# Patient Record
Sex: MALE | Race: WHITE | HISPANIC OR LATINO | Employment: OTHER | URBAN - NONMETROPOLITAN AREA
[De-identification: names, ages, dates, MRNs, and addresses within clinical notes are randomized per-mention and may not be internally consistent; named-entity substitution may affect disease eponyms.]

---

## 2024-01-08 DIAGNOSIS — I82.90 VENOUS THROMBOSIS: ICD-10-CM

## 2024-01-08 PROBLEM — I63.9 STROKE (MULTI): Status: ACTIVE | Noted: 2024-01-08

## 2024-01-08 PROBLEM — R52 PAIN: Status: ACTIVE | Noted: 2024-01-08

## 2024-01-08 PROBLEM — R47.1 DYSARTHRIA: Status: ACTIVE | Noted: 2024-01-08

## 2024-01-08 PROBLEM — M54.9 BACKACHE: Status: ACTIVE | Noted: 2024-01-08

## 2024-01-08 PROBLEM — D50.9 IRON DEFICIENCY ANEMIA: Status: ACTIVE | Noted: 2024-01-08

## 2024-01-08 PROBLEM — I10 HIGH BLOOD PRESSURE: Status: ACTIVE | Noted: 2024-01-08

## 2024-01-08 PROBLEM — E11.51: Status: ACTIVE | Noted: 2024-01-08

## 2024-01-08 PROBLEM — R26.9 GAIT ABNORMALITY: Status: ACTIVE | Noted: 2024-01-08

## 2024-01-08 PROBLEM — D64.9 NORMOCHROMIC NORMOCYTIC ANEMIA: Status: ACTIVE | Noted: 2024-01-08

## 2024-01-08 PROBLEM — Z86.73 HISTORY OF STROKE WITHOUT RESIDUAL DEFICITS: Status: ACTIVE | Noted: 2024-01-08

## 2024-01-08 PROBLEM — E11.3393: Status: ACTIVE | Noted: 2024-01-08

## 2024-01-08 PROBLEM — E87.8 FLUID VOLUME DISORDER: Status: ACTIVE | Noted: 2024-01-08

## 2024-01-08 PROBLEM — R29.898 RUE WEAKNESS: Status: ACTIVE | Noted: 2024-01-08

## 2024-01-08 PROBLEM — I10 BENIGN ESSENTIAL HYPERTENSION: Status: ACTIVE | Noted: 2024-01-08

## 2024-01-08 PROBLEM — Z91.148 NONCOMPLIANCE WITH MEDICATION REGIMEN: Status: ACTIVE | Noted: 2024-01-08

## 2024-01-08 PROBLEM — D72.819 LEUKOPENIA: Status: ACTIVE | Noted: 2024-01-08

## 2024-01-08 PROBLEM — E78.00 HIGH CHOLESTEROL: Status: ACTIVE | Noted: 2024-01-08

## 2024-01-08 PROBLEM — E11.65 HYPERGLYCEMIA DUE TO TYPE 2 DIABETES MELLITUS (MULTI): Status: ACTIVE | Noted: 2024-01-08

## 2024-01-08 PROBLEM — M54.50 ACUTE LOW BACK PAIN: Status: ACTIVE | Noted: 2024-01-08

## 2024-01-08 PROBLEM — R26.89 OTHER ABNORMALITIES OF GAIT AND MOBILITY: Status: ACTIVE | Noted: 2024-01-08

## 2024-01-08 RX ORDER — HYDROCHLOROTHIAZIDE 25 MG/1
TABLET ORAL
COMMUNITY
End: 2024-03-23

## 2024-01-08 RX ORDER — ATORVASTATIN CALCIUM 40 MG/1
1 TABLET, FILM COATED ORAL DAILY
COMMUNITY
End: 2024-03-11

## 2024-01-08 RX ORDER — BROMFENAC SODIUM 0.7 MG/ML
SOLUTION/ DROPS OPHTHALMIC
COMMUNITY
Start: 2023-12-13

## 2024-01-08 RX ORDER — METFORMIN HYDROCHLORIDE 500 MG/1
TABLET ORAL
COMMUNITY
End: 2024-01-09 | Stop reason: ALTCHOICE

## 2024-01-08 RX ORDER — ASPIRIN 81 MG/1
TABLET ORAL
COMMUNITY

## 2024-01-08 RX ORDER — FERROUS SULFATE 325(65) MG
TABLET ORAL
COMMUNITY

## 2024-01-08 RX ORDER — LISINOPRIL 20 MG/1
TABLET ORAL
COMMUNITY
End: 2024-01-09 | Stop reason: ALTCHOICE

## 2024-01-08 RX ORDER — PIOGLITAZONEHYDROCHLORIDE 15 MG/1
TABLET ORAL
COMMUNITY
End: 2024-03-23

## 2024-01-08 RX ORDER — CLOPIDOGREL BISULFATE 75 MG/1
TABLET ORAL
OUTPATIENT
Start: 2024-01-08

## 2024-01-08 RX ORDER — NAPROXEN SODIUM 220 MG/1
TABLET, FILM COATED ORAL
COMMUNITY
End: 2024-01-09 | Stop reason: ALTCHOICE

## 2024-01-08 RX ORDER — LISINOPRIL AND HYDROCHLOROTHIAZIDE 20; 25 MG/1; MG/1
TABLET ORAL
COMMUNITY
End: 2024-01-09 | Stop reason: ALTCHOICE

## 2024-01-08 RX ORDER — BACLOFEN 10 MG/1
TABLET ORAL
COMMUNITY
End: 2024-01-09 | Stop reason: ALTCHOICE

## 2024-01-08 RX ORDER — CLOPIDOGREL BISULFATE 75 MG/1
TABLET ORAL
COMMUNITY
End: 2024-01-23 | Stop reason: SDUPTHER

## 2024-01-08 RX ORDER — INSULIN DEGLUDEC 100 U/ML
INJECTION, SOLUTION SUBCUTANEOUS
COMMUNITY

## 2024-01-08 RX ORDER — MULTIVITAMIN
TABLET ORAL
COMMUNITY

## 2024-01-09 ENCOUNTER — OFFICE VISIT (OUTPATIENT)
Dept: PRIMARY CARE | Facility: CLINIC | Age: 79
End: 2024-01-09
Payer: MEDICARE

## 2024-01-09 VITALS
WEIGHT: 174.6 LBS | DIASTOLIC BLOOD PRESSURE: 62 MMHG | OXYGEN SATURATION: 97 % | HEART RATE: 82 BPM | BODY MASS INDEX: 25.41 KG/M2 | SYSTOLIC BLOOD PRESSURE: 132 MMHG

## 2024-01-09 DIAGNOSIS — Z79.4 TYPE 2 DIABETES MELLITUS WITHOUT COMPLICATION, WITH LONG-TERM CURRENT USE OF INSULIN (MULTI): ICD-10-CM

## 2024-01-09 DIAGNOSIS — H26.9 CATARACT OF BOTH EYES, UNSPECIFIED CATARACT TYPE: Primary | ICD-10-CM

## 2024-01-09 DIAGNOSIS — E11.9 TYPE 2 DIABETES MELLITUS WITHOUT COMPLICATION, WITH LONG-TERM CURRENT USE OF INSULIN (MULTI): ICD-10-CM

## 2024-01-09 LAB — POC HEMOGLOBIN A1C: 9.1 % (ref 4.2–6.5)

## 2024-01-09 PROCEDURE — 93010 ELECTROCARDIOGRAM REPORT: CPT | Performed by: FAMILY MEDICINE

## 2024-01-09 PROCEDURE — 3075F SYST BP GE 130 - 139MM HG: CPT | Performed by: FAMILY MEDICINE

## 2024-01-09 PROCEDURE — 99214 OFFICE O/P EST MOD 30 MIN: CPT | Performed by: FAMILY MEDICINE

## 2024-01-09 PROCEDURE — 1036F TOBACCO NON-USER: CPT | Performed by: FAMILY MEDICINE

## 2024-01-09 PROCEDURE — 1126F AMNT PAIN NOTED NONE PRSNT: CPT | Performed by: FAMILY MEDICINE

## 2024-01-09 PROCEDURE — 3078F DIAST BP <80 MM HG: CPT | Performed by: FAMILY MEDICINE

## 2024-01-09 PROCEDURE — 1159F MED LIST DOCD IN RCRD: CPT | Performed by: FAMILY MEDICINE

## 2024-01-09 PROCEDURE — 93005 ELECTROCARDIOGRAM TRACING: CPT | Performed by: FAMILY MEDICINE

## 2024-01-09 ASSESSMENT — ENCOUNTER SYMPTOMS
PALPITATIONS: 0
OCCASIONAL FEELINGS OF UNSTEADINESS: 0
POLYPHAGIA: 0
POLYDIPSIA: 0
ABDOMINAL DISTENTION: 0
DEPRESSION: 0
DIZZINESS: 0
COUGH: 0
CHEST TIGHTNESS: 0
HEADACHES: 0
LIGHT-HEADEDNESS: 0
LOSS OF SENSATION IN FEET: 0
SHORTNESS OF BREATH: 0
ACTIVITY CHANGE: 0

## 2024-01-09 ASSESSMENT — PATIENT HEALTH QUESTIONNAIRE - PHQ9
2. FEELING DOWN, DEPRESSED OR HOPELESS: NOT AT ALL
1. LITTLE INTEREST OR PLEASURE IN DOING THINGS: NOT AT ALL
SUM OF ALL RESPONSES TO PHQ9 QUESTIONS 1 AND 2: 0

## 2024-01-09 ASSESSMENT — PAIN SCALES - GENERAL: PAINLEVEL: 0-NO PAIN

## 2024-01-09 NOTE — PROGRESS NOTES
Subjective   Patient ID: Landry Hernández is a 78 y.o. male who presents for Surgical Clearance.    HPI   He presents today for surgical clearance for the cataract surgery.  He states he has bilateral cataracts he is going to have the surgeries on different days.    He does still see the nurse practitioner at Dr. Guardado's office.  I do not see that he has been there recently.  Hemoglobin A1c is elevated.  He states he checks his blood sugar once every few weeks.    Review of Systems   Constitutional:  Negative for activity change.   Respiratory:  Negative for cough, chest tightness and shortness of breath.    Cardiovascular:  Negative for chest pain, palpitations and leg swelling.   Gastrointestinal:  Negative for abdominal distention.   Endocrine: Negative for polydipsia, polyphagia and polyuria.   Neurological:  Negative for dizziness, light-headedness and headaches.       Objective   /62   Pulse 82   Wt 79.2 kg (174 lb 9.6 oz)   SpO2 97%   BMI 25.41 kg/m²     Physical Exam  Constitutional:       Appearance: Normal appearance.   HENT:      Mouth/Throat:      Mouth: Mucous membranes are moist.      Pharynx: Oropharynx is clear.   Neck:      Thyroid: No thyromegaly or thyroid tenderness.      Vascular: No carotid bruit.   Cardiovascular:      Rate and Rhythm: Normal rate and regular rhythm.      Heart sounds: No murmur heard.  Pulmonary:      Effort: Pulmonary effort is normal.      Breath sounds: Normal breath sounds.   Musculoskeletal:      Cervical back: Neck supple.   Lymphadenopathy:      Cervical: No cervical adenopathy.   Neurological:      Mental Status: He is alert.   Psychiatric:         Mood and Affect: Mood normal.         Assessment/Plan   Diagnoses and all orders for this visit:  Cataract of both eyes, unspecified cataract type  Type 2 diabetes mellitus without complication, with long-term current use of insulin (CMS/[facility])  He is cleared for the cataract surgery but is sugar remains high.  I  discussed with him wearing a freestyle kamryn and checking with the sensor so that we can get an idea what his blood sugars are.  He is agreeable to this.  I will have Pham call him and set this up and instruct him in this and we can download this and adjust his medicines for him.  We can do that here or we can have Dr. Guardado's office do it, since Pham works in both spots.

## 2024-01-23 DIAGNOSIS — I63.9 CEREBROVASCULAR ACCIDENT (CVA), UNSPECIFIED MECHANISM (MULTI): Primary | ICD-10-CM

## 2024-01-23 RX ORDER — CLOPIDOGREL BISULFATE 75 MG/1
75 TABLET ORAL DAILY
Qty: 90 TABLET | Refills: 0 | Status: SHIPPED | OUTPATIENT
Start: 2024-01-23 | End: 2024-03-19

## 2024-01-23 NOTE — TELEPHONE ENCOUNTER
Last OV 01/09/2024-I called [facility] and was told last fill was 06/2023 from Dr Wiggins. The patients wife could not remember who the RX was first written by since she stated the patient does not see anyone else except for Luanne Brandt. The patient has had strokes in the past.

## 2024-03-08 DIAGNOSIS — E78.00 HIGH CHOLESTEROL: Primary | ICD-10-CM

## 2024-03-11 RX ORDER — ATORVASTATIN CALCIUM 40 MG/1
40 TABLET, FILM COATED ORAL DAILY
Qty: 90 TABLET | Refills: 0 | Status: SHIPPED | OUTPATIENT
Start: 2024-03-11 | End: 2024-05-08

## 2024-03-18 DIAGNOSIS — I63.9 CEREBROVASCULAR ACCIDENT (CVA), UNSPECIFIED MECHANISM (MULTI): ICD-10-CM

## 2024-03-19 RX ORDER — CLOPIDOGREL BISULFATE 75 MG/1
75 TABLET ORAL DAILY
Qty: 90 TABLET | Refills: 3 | Status: SHIPPED | OUTPATIENT
Start: 2024-03-19

## 2024-03-19 NOTE — TELEPHONE ENCOUNTER
Last refill 1/23   90 tabs   0 refill  Patient confirmed he has approx 60 tabs left, no refill needed.  Please cancel rx

## 2024-03-22 DIAGNOSIS — E11.51: ICD-10-CM

## 2024-03-22 DIAGNOSIS — I10 PRIMARY HYPERTENSION: Primary | ICD-10-CM

## 2024-03-23 RX ORDER — HYDROCHLOROTHIAZIDE 25 MG/1
TABLET ORAL
Qty: 90 TABLET | Refills: 0 | Status: SHIPPED | OUTPATIENT
Start: 2024-03-23

## 2024-03-23 RX ORDER — PIOGLITAZONEHYDROCHLORIDE 15 MG/1
TABLET ORAL
Qty: 90 TABLET | Refills: 0 | Status: SHIPPED | OUTPATIENT
Start: 2024-03-23

## 2024-04-25 DIAGNOSIS — E11.65 TYPE 2 DIABETES MELLITUS WITH HYPERGLYCEMIA, UNSPECIFIED WHETHER LONG TERM INSULIN USE (MULTI): Primary | ICD-10-CM

## 2024-04-26 RX ORDER — METFORMIN HYDROCHLORIDE 1000 MG/1
1000 TABLET ORAL 2 TIMES DAILY
Qty: 200 TABLET | Refills: 2 | Status: SHIPPED | OUTPATIENT
Start: 2024-04-26

## 2024-04-30 ENCOUNTER — TELEPHONE (OUTPATIENT)
Dept: ENDOCRINOLOGY | Facility: CLINIC | Age: 79
End: 2024-04-30
Payer: MEDICARE

## 2024-04-30 NOTE — TELEPHONE ENCOUNTER
Called to advise patient that his patient assistance for Ozempic and Forteo came into the office.     Daniella

## 2024-05-01 ENCOUNTER — TELEPHONE (OUTPATIENT)
Dept: ENDOCRINOLOGY | Facility: CLINIC | Age: 79
End: 2024-05-01
Payer: MEDICARE

## 2024-05-02 ENCOUNTER — TELEPHONE (OUTPATIENT)
Dept: ENDOCRINOLOGY | Facility: CLINIC | Age: 79
End: 2024-05-02
Payer: MEDICARE

## 2024-05-07 ENCOUNTER — TELEPHONE (OUTPATIENT)
Dept: ENDOCRINOLOGY | Facility: CLINIC | Age: 79
End: 2024-05-07
Payer: MEDICARE

## 2024-05-07 DIAGNOSIS — E78.00 HIGH CHOLESTEROL: ICD-10-CM

## 2024-05-07 NOTE — TELEPHONE ENCOUNTER
Patient's spouse called in stating Metformin was stopped by [facility], but [facility] sent in a new Rx for the medication.     I advised patient to call the mail order pharmacy and cancel the mailing of the medication.     Daniella

## 2024-05-08 RX ORDER — ATORVASTATIN CALCIUM 40 MG/1
40 TABLET, FILM COATED ORAL DAILY
Qty: 90 TABLET | Refills: 3 | Status: SHIPPED | OUTPATIENT
Start: 2024-05-08

## 2024-06-11 ENCOUNTER — TELEPHONE (OUTPATIENT)
Dept: PRIMARY CARE | Facility: CLINIC | Age: 79
End: 2024-06-11
Payer: MEDICARE

## 2024-06-11 NOTE — TELEPHONE ENCOUNTER
Received paperwork for DNR paperwork.  Call placed to spouse Rachell and explained the difference between DNRCC ARREST and DNRCC.  Pt and spouse both stated that he wants DNRCC.

## 2024-06-11 NOTE — TELEPHONE ENCOUNTER
I signed it. 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

## 2024-06-24 DIAGNOSIS — E11.51: ICD-10-CM

## 2024-06-24 DIAGNOSIS — I10 PRIMARY HYPERTENSION: ICD-10-CM

## 2024-06-25 RX ORDER — PIOGLITAZONEHYDROCHLORIDE 15 MG/1
15 TABLET ORAL DAILY
Qty: 90 TABLET | Refills: 0 | Status: SHIPPED | OUTPATIENT
Start: 2024-06-25 | End: 2024-09-23

## 2024-06-25 RX ORDER — HYDROCHLOROTHIAZIDE 25 MG/1
25 TABLET ORAL
Qty: 90 TABLET | Refills: 0 | Status: SHIPPED | OUTPATIENT
Start: 2024-06-25

## 2024-06-27 ENCOUNTER — APPOINTMENT (OUTPATIENT)
Dept: ENDOCRINOLOGY | Facility: CLINIC | Age: 79
End: 2024-06-27

## 2024-06-27 VITALS
BODY MASS INDEX: 25.36 KG/M2 | WEIGHT: 174.2 LBS | HEART RATE: 54 BPM | DIASTOLIC BLOOD PRESSURE: 50 MMHG | SYSTOLIC BLOOD PRESSURE: 128 MMHG

## 2024-06-27 DIAGNOSIS — Z79.4 TYPE 2 DIABETES MELLITUS WITH HYPERGLYCEMIA, WITH LONG-TERM CURRENT USE OF INSULIN (MULTI): Primary | ICD-10-CM

## 2024-06-27 DIAGNOSIS — E11.65 TYPE 2 DIABETES MELLITUS WITH HYPERGLYCEMIA, WITH LONG-TERM CURRENT USE OF INSULIN (MULTI): Primary | ICD-10-CM

## 2024-06-27 LAB — POC HEMOGLOBIN A1C: 8.9 % (ref 4.2–6.5)

## 2024-06-27 PROCEDURE — 99214 OFFICE O/P EST MOD 30 MIN: CPT | Performed by: INTERNAL MEDICINE

## 2024-06-27 PROCEDURE — 83036 HEMOGLOBIN GLYCOSYLATED A1C: CPT | Performed by: INTERNAL MEDICINE

## 2024-06-27 NOTE — PROGRESS NOTES
HPI     Here for follow up care/metabolic management 78 yo with DM Type 2 diagnosed over 10 years ago. History HTN, HLD, CVA.  A1c- 8.9% today.  Last seen by our office June 2023.    Patient testing sugars 0 time a day.  Unclear if he is following carb controlled diet, cultural background food high in carbohydrates. Patient able to afford their medications. Patient is exercising, walking.    -CGM discusssed previous visits, not interested    Taking Tresiba 30 units, metformin max dose, victoza 1.8mg daily, pioglitazone 15mg.   -patient unable to state dose of insulin/victoza taking, wife gives injections daily    -HTN HCTZ daily, Lisinopril previously (was having K+ elevations)    -STATIN Atorvastatin 40mg daily & tolerating        Current Outpatient Medications:     aspirin (Kolby Low Dose Aspirin) 81 mg EC tablet, , Disp: , Rfl:     atorvastatin (Lipitor) 40 mg tablet, TAKE 1 TABLET BY MOUTH ONCE  DAILY, Disp: 90 tablet, Rfl: 3    clopidogrel (Plavix) 75 mg tablet, TAKE 1 TABLET BY MOUTH ONCE  DAILY, Disp: 90 tablet, Rfl: 3    ferrous sulfate, 325 mg ferrous sulfate, tablet, TAKE ONE TABLET BY MOUTH THREE TIMES A DAY Oral, Disp: , Rfl:     hydroCHLOROthiazide (HYDRODiuril) 25 mg tablet, TAKE 1 TABLET BY MOUTH EVERY MORNING, Disp: 90 tablet, Rfl: 0    insulin degludec (Tresiba FlexTouch U-100) 100 unit/mL (3 mL) injection, USE AS DIRECTED UP TO 30 UNITS EVERY DAY Subcutaneous, Disp: , Rfl:     metFORMIN (Glucophage) 1,000 mg tablet, TAKE 1 TABLET BY MOUTH TWICE  DAILY, Disp: 200 tablet, Rfl: 2    multivitamin (Daily Multi-Vitamin) tablet, Take by mouth., Disp: , Rfl:     pioglitazone (Actos) 15 mg tablet, TAKE 1 TABLET BY MOUTH EVERY DAY FOR 90 DAYS, Disp: 90 tablet, Rfl: 0    Prolensa 0.07 % drops, Instill 1 drop to affected eye every day.  Start 3 days before surgery., Disp: , Rfl:     Allergies as of 06/27/2024    (No Known Allergies)       /50   Pulse 54   Wt 79 kg (174 lb 3.2 oz)   BMI 25.36 kg/m²      Labs:   Lab Results   Component Value Date    WBC 6.0 06/08/2023    NRBC 0 06/08/2023    RBC 4.31 (L) 06/08/2023    HGB 13.2 (L) 06/08/2023    HCT 41.8 06/08/2023     06/08/2023     Lab Results   Component Value Date    CALCIUM 9.7 06/08/2023    AST 25 06/08/2023    ALKPHOS 86 06/08/2023    BILITOT 0.4 06/08/2023    PROT 6.5 06/08/2023    ALBUMIN 4.0 06/08/2023    GLOB 2.5 06/08/2023    AGR 1.6 06/08/2023     06/08/2023    K 5.0 06/08/2023     06/08/2023    CO2 28 06/08/2023    ANIONGAP 9 06/08/2023    BUN 17 06/08/2023    CREATININE 1.3 06/08/2023    UREACREAUR 13.1 06/08/2023    GLUCOSE 106 (H) 06/08/2023    ALT 20 06/08/2023    EGFR 56 06/08/2023     Lab Results   Component Value Date    CHOL 122 (L) 06/08/2023    TRIG 68 06/08/2023    HDL 45 06/08/2023    LDLCALC 63 (L) 06/08/2023     Lab Results   Component Value Date    MICROALBCREA 87.3 (H) 06/08/2023     Lab Results   Component Value Date    TSH 3.22 06/02/2022     Lab Results   Component Value Date    [id number] 163 (L) 06/08/2023     Lab Results   Component Value Date    HGBA1C 8.9 (A) 06/27/2024         Assessment/Plan   1. Type 2 diabetes mellitus with hyperglycemia, with long-term current use of insulin (Multi)    -A1c ordered & reviewed  -labs/notes reviewed     -pt does not know any of his medications he is taking, is not testing blood sugar   -have repeatedly asked pt's wife to accompany him to visit   -our office can not continue to provide care/medication refills if we do not know what pt is doing       -Mrs. Hernández, please make a follow up appointment with Max, our nurse practitioner in 2-3 months.    **We request that you accompany Mr. Hernández to this visit before our office will provide any further medication refills**      Follow up: 2-3mon Max    -labs/tests/notes reviewed  -reviewed and counseled patient on medication monitoring and side effects    Treatment and plan discussed with Dr. Rousseau.  JOSEPHINE Brewer, PharmD,  BC-ADM, [facility].     Medical Decision Making  Complexity of problem: Chronic illness of diabetes mellitus uncontrolled, progressing  Data analyzed and reviewed: Reviewed prior notes, blood glucose data, labs including HgbA1c, lipids, serum chemistries.  Ordered tests.   Risk of complications and morbidities: Is definite because of use of insulin and risk of hypoglycemia.  Prescription medications reviewed and modifications made.  Compliance assessed.  Addressed social determinants of health including food insecurity.

## 2024-06-27 NOTE — PATIENT INSTRUCTIONS
Mrs. Hernández, please make a follow up appointment with Max, our nurse practitioner in 2-3 months.    **We request that you accompany Mr. Hernández to this visit before our office will provide any further medication refills**    Lab Results   Component Value Date    HGBA1C 8.9 (A) 06/27/2024

## 2024-07-25 ENCOUNTER — APPOINTMENT (OUTPATIENT)
Dept: RADIOLOGY | Facility: MEDICAL CENTER | Age: 79
End: 2024-07-25
Attending: EMERGENCY MEDICINE
Payer: MEDICARE

## 2024-07-25 ENCOUNTER — HOSPITAL ENCOUNTER (INPATIENT)
Facility: MEDICAL CENTER | Age: 79
LOS: 4 days | End: 2024-07-29
Attending: EMERGENCY MEDICINE | Admitting: STUDENT IN AN ORGANIZED HEALTH CARE EDUCATION/TRAINING PROGRAM
Payer: MEDICARE

## 2024-07-25 ENCOUNTER — APPOINTMENT (OUTPATIENT)
Dept: RADIOLOGY | Facility: MEDICAL CENTER | Age: 79
End: 2024-07-25
Attending: STUDENT IN AN ORGANIZED HEALTH CARE EDUCATION/TRAINING PROGRAM
Payer: MEDICARE

## 2024-07-25 DIAGNOSIS — R65.20 SEPSIS WITH ORGAN DYSFUNCTION (HCC): ICD-10-CM

## 2024-07-25 DIAGNOSIS — A41.9 SEPSIS WITH ORGAN DYSFUNCTION (HCC): ICD-10-CM

## 2024-07-25 DIAGNOSIS — H49.02 LEFT OCULOMOTOR NERVE PALSY: ICD-10-CM

## 2024-07-25 DIAGNOSIS — I16.0 HYPERTENSIVE URGENCY: ICD-10-CM

## 2024-07-25 DIAGNOSIS — H49.02 3RD NERVE PALSY, COMPLETE, LEFT: ICD-10-CM

## 2024-07-25 PROBLEM — G52.9 CRANIAL NERVE PALSY: Status: ACTIVE | Noted: 2024-07-25

## 2024-07-25 PROBLEM — K04.7 DENTAL ABSCESS: Status: ACTIVE | Noted: 2024-07-25

## 2024-07-25 PROBLEM — H49.00 3RD CRANIAL NERVE PALSY: Status: ACTIVE | Noted: 2024-07-25

## 2024-07-25 PROBLEM — Z71.89 ADVANCED CARE PLANNING/COUNSELING DISCUSSION: Status: ACTIVE | Noted: 2024-07-25

## 2024-07-25 LAB
ALBUMIN SERPL BCP-MCNC: 4.3 G/DL (ref 3.2–4.9)
ALBUMIN/GLOB SERPL: 1.3 G/DL
ALP SERPL-CCNC: 83 U/L (ref 30–99)
ALT SERPL-CCNC: 7 U/L (ref 2–50)
ANION GAP SERPL CALC-SCNC: 15 MMOL/L (ref 7–16)
APTT PPP: 25.1 SEC (ref 24.7–36)
AST SERPL-CCNC: 21 U/L (ref 12–45)
BASOPHILS # BLD AUTO: 0.6 % (ref 0–1.8)
BASOPHILS # BLD: 0.09 K/UL (ref 0–0.12)
BILIRUB SERPL-MCNC: 0.9 MG/DL (ref 0.1–1.5)
BUN SERPL-MCNC: 13 MG/DL (ref 8–22)
CALCIUM ALBUM COR SERPL-MCNC: 9.1 MG/DL (ref 8.5–10.5)
CALCIUM SERPL-MCNC: 9.3 MG/DL (ref 8.5–10.5)
CHLORIDE SERPL-SCNC: 102 MMOL/L (ref 96–112)
CO2 SERPL-SCNC: 22 MMOL/L (ref 20–33)
CREAT SERPL-MCNC: 0.98 MG/DL (ref 0.5–1.4)
EOSINOPHIL # BLD AUTO: 0.06 K/UL (ref 0–0.51)
EOSINOPHIL NFR BLD: 0.4 % (ref 0–6.9)
ERYTHROCYTE [DISTWIDTH] IN BLOOD BY AUTOMATED COUNT: 47.2 FL (ref 35.9–50)
GFR SERPLBLD CREATININE-BSD FMLA CKD-EPI: 78 ML/MIN/1.73 M 2
GLOBULIN SER CALC-MCNC: 3.4 G/DL (ref 1.9–3.5)
GLUCOSE SERPL-MCNC: 118 MG/DL (ref 65–99)
HCT VFR BLD AUTO: 46.6 % (ref 42–52)
HGB BLD-MCNC: 15.7 G/DL (ref 14–18)
IMM GRANULOCYTES # BLD AUTO: 0.05 K/UL (ref 0–0.11)
IMM GRANULOCYTES NFR BLD AUTO: 0.3 % (ref 0–0.9)
INR PPP: 1.02 (ref 0.87–1.13)
LYMPHOCYTES # BLD AUTO: 2.86 K/UL (ref 1–4.8)
LYMPHOCYTES NFR BLD: 19.7 % (ref 22–41)
MCH RBC QN AUTO: 32.6 PG (ref 27–33)
MCHC RBC AUTO-ENTMCNC: 33.7 G/DL (ref 32.3–36.5)
MCV RBC AUTO: 96.7 FL (ref 81.4–97.8)
MONOCYTES # BLD AUTO: 0.87 K/UL (ref 0–0.85)
MONOCYTES NFR BLD AUTO: 6 % (ref 0–13.4)
NEUTROPHILS # BLD AUTO: 10.59 K/UL (ref 1.82–7.42)
NEUTROPHILS NFR BLD: 73 % (ref 44–72)
NRBC # BLD AUTO: 0 K/UL
NRBC BLD-RTO: 0 /100 WBC (ref 0–0.2)
PLATELET # BLD AUTO: 312 K/UL (ref 164–446)
PMV BLD AUTO: 10.8 FL (ref 9–12.9)
POTASSIUM SERPL-SCNC: 4.3 MMOL/L (ref 3.6–5.5)
PROT SERPL-MCNC: 7.7 G/DL (ref 6–8.2)
PROTHROMBIN TIME: 13.5 SEC (ref 12–14.6)
RBC # BLD AUTO: 4.82 M/UL (ref 4.7–6.1)
SODIUM SERPL-SCNC: 139 MMOL/L (ref 135–145)
TROPONIN T SERPL-MCNC: 21 NG/L (ref 6–19)
WBC # BLD AUTO: 14.5 K/UL (ref 4.8–10.8)

## 2024-07-25 PROCEDURE — 700117 HCHG RX CONTRAST REV CODE 255: Performed by: EMERGENCY MEDICINE

## 2024-07-25 PROCEDURE — 99497 ADVNCD CARE PLAN 30 MIN: CPT | Performed by: STUDENT IN AN ORGANIZED HEALTH CARE EDUCATION/TRAINING PROGRAM

## 2024-07-25 PROCEDURE — 70496 CT ANGIOGRAPHY HEAD: CPT

## 2024-07-25 PROCEDURE — 85025 COMPLETE CBC W/AUTO DIFF WBC: CPT

## 2024-07-25 PROCEDURE — 700111 HCHG RX REV CODE 636 W/ 250 OVERRIDE (IP): Mod: JZ | Performed by: EMERGENCY MEDICINE

## 2024-07-25 PROCEDURE — 85610 PROTHROMBIN TIME: CPT

## 2024-07-25 PROCEDURE — 96374 THER/PROPH/DIAG INJ IV PUSH: CPT

## 2024-07-25 PROCEDURE — 99285 EMERGENCY DEPT VISIT HI MDM: CPT

## 2024-07-25 PROCEDURE — 70486 CT MAXILLOFACIAL W/O DYE: CPT

## 2024-07-25 PROCEDURE — 84484 ASSAY OF TROPONIN QUANT: CPT

## 2024-07-25 PROCEDURE — 85730 THROMBOPLASTIN TIME PARTIAL: CPT

## 2024-07-25 PROCEDURE — 99223 1ST HOSP IP/OBS HIGH 75: CPT | Mod: AI,25 | Performed by: STUDENT IN AN ORGANIZED HEALTH CARE EDUCATION/TRAINING PROGRAM

## 2024-07-25 PROCEDURE — 70498 CT ANGIOGRAPHY NECK: CPT

## 2024-07-25 PROCEDURE — 80053 COMPREHEN METABOLIC PANEL: CPT

## 2024-07-25 PROCEDURE — 36415 COLL VENOUS BLD VENIPUNCTURE: CPT

## 2024-07-25 PROCEDURE — 770001 HCHG ROOM/CARE - MED/SURG/GYN PRIV*

## 2024-07-25 RX ORDER — VITAMIN B COMPLEX
1000 TABLET ORAL DAILY
COMMUNITY

## 2024-07-25 RX ORDER — CEPHALEXIN 500 MG/1
1000 CAPSULE ORAL 3 TIMES DAILY
Status: ON HOLD | COMMUNITY
Start: 2024-07-18 | End: 2024-07-29

## 2024-07-25 RX ORDER — ONDANSETRON 2 MG/ML
4 INJECTION INTRAMUSCULAR; INTRAVENOUS EVERY 4 HOURS PRN
Status: DISCONTINUED | OUTPATIENT
Start: 2024-07-25 | End: 2024-07-29 | Stop reason: HOSPADM

## 2024-07-25 RX ORDER — ASCORBIC ACID 500 MG
500 TABLET ORAL DAILY
COMMUNITY

## 2024-07-25 RX ORDER — ACETAMINOPHEN 325 MG/1
650 TABLET ORAL EVERY 6 HOURS PRN
Status: DISCONTINUED | OUTPATIENT
Start: 2024-07-25 | End: 2024-07-29 | Stop reason: HOSPADM

## 2024-07-25 RX ORDER — HYDRALAZINE HYDROCHLORIDE 20 MG/ML
20 INJECTION INTRAMUSCULAR; INTRAVENOUS ONCE
Status: COMPLETED | OUTPATIENT
Start: 2024-07-25 | End: 2024-07-25

## 2024-07-25 RX ORDER — MAGNESIUM OXIDE 400 MG/1
400 TABLET ORAL DAILY
COMMUNITY

## 2024-07-25 RX ORDER — HYDRALAZINE HYDROCHLORIDE 20 MG/ML
10 INJECTION INTRAMUSCULAR; INTRAVENOUS EVERY 4 HOURS PRN
Status: DISCONTINUED | OUTPATIENT
Start: 2024-07-25 | End: 2024-07-29 | Stop reason: HOSPADM

## 2024-07-25 RX ORDER — IBUPROFEN 200 MG
400 TABLET ORAL 2 TIMES DAILY
COMMUNITY

## 2024-07-25 RX ORDER — ENOXAPARIN SODIUM 100 MG/ML
40 INJECTION SUBCUTANEOUS DAILY
Status: DISCONTINUED | OUTPATIENT
Start: 2024-07-25 | End: 2024-07-28

## 2024-07-25 RX ORDER — IBUPROFEN 200 MG
600 CAPSULE ORAL DAILY
COMMUNITY

## 2024-07-25 RX ORDER — VITAMIN E 268 MG
400 CAPSULE ORAL DAILY
COMMUNITY

## 2024-07-25 RX ORDER — ACETAMINOPHEN 325 MG/1
650 TABLET ORAL EVERY 4 HOURS PRN
COMMUNITY

## 2024-07-25 RX ORDER — LABETALOL HYDROCHLORIDE 5 MG/ML
10 INJECTION, SOLUTION INTRAVENOUS ONCE
Status: ACTIVE | OUTPATIENT
Start: 2024-07-25 | End: 2024-07-26

## 2024-07-25 RX ORDER — ZINC SULFATE 50(220)MG
220 CAPSULE ORAL DAILY
COMMUNITY

## 2024-07-25 RX ORDER — ONDANSETRON 4 MG/1
4 TABLET, ORALLY DISINTEGRATING ORAL EVERY 4 HOURS PRN
Status: DISCONTINUED | OUTPATIENT
Start: 2024-07-25 | End: 2024-07-29 | Stop reason: HOSPADM

## 2024-07-25 RX ADMIN — HYDRALAZINE HYDROCHLORIDE 20 MG: 20 INJECTION, SOLUTION INTRAMUSCULAR; INTRAVENOUS at 17:53

## 2024-07-25 RX ADMIN — IOHEXOL 80 ML: 350 INJECTION, SOLUTION INTRAVENOUS at 19:31

## 2024-07-25 ASSESSMENT — ENCOUNTER SYMPTOMS
HEMOPTYSIS: 0
BACK PAIN: 0
SHORTNESS OF BREATH: 0
VOMITING: 0
HALLUCINATIONS: 0
NAUSEA: 0
EYE PAIN: 0
HEADACHES: 0
EYE DISCHARGE: 0
SPUTUM PRODUCTION: 0
NECK PAIN: 0
PALPITATIONS: 0
BLURRED VISION: 0
CHILLS: 0
DIARRHEA: 0
DOUBLE VISION: 0
SINUS PAIN: 0
NERVOUS/ANXIOUS: 0
ORTHOPNEA: 0
ABDOMINAL PAIN: 0
PHOTOPHOBIA: 0
INSOMNIA: 0
FEVER: 0
DIZZINESS: 0

## 2024-07-25 ASSESSMENT — LIFESTYLE VARIABLES: SUBSTANCE_ABUSE: 0

## 2024-07-25 NOTE — ED TRIAGE NOTES
.  Chief Complaint   Patient presents with    Sent by MD     PT amb to triage for above. PT sent by dentist for high blood pressure and L eye droop. L eye droop x 1 week. Increasing pain to eye x 2 days.   Equal bilateral  strength. Pt AOX4. No drift to bilateral. Facial symmetry equal with exception to L eye droop/closure.   Eductaed on triage process and to alert staff if anything changes.   Recent antibiotics use for tooth infection.   Charge RN notified   Pt states he had high blood pressure a long time ago.

## 2024-07-26 ENCOUNTER — APPOINTMENT (OUTPATIENT)
Dept: RADIOLOGY | Facility: MEDICAL CENTER | Age: 79
End: 2024-07-26
Attending: STUDENT IN AN ORGANIZED HEALTH CARE EDUCATION/TRAINING PROGRAM
Payer: MEDICARE

## 2024-07-26 PROBLEM — A41.9 SEPSIS WITH ORGAN DYSFUNCTION (HCC): Status: ACTIVE | Noted: 2024-07-26

## 2024-07-26 PROBLEM — R65.20 SEPSIS WITH ORGAN DYSFUNCTION (HCC): Status: ACTIVE | Noted: 2024-07-26

## 2024-07-26 LAB
ALBUMIN SERPL BCP-MCNC: 3.8 G/DL (ref 3.2–4.9)
ALBUMIN/GLOB SERPL: 1.5 G/DL
ALP SERPL-CCNC: 70 U/L (ref 30–99)
ALT SERPL-CCNC: 12 U/L (ref 2–50)
ANION GAP SERPL CALC-SCNC: 13 MMOL/L (ref 7–16)
APPEARANCE UR: CLEAR
AST SERPL-CCNC: 28 U/L (ref 12–45)
BILIRUB SERPL-MCNC: 0.9 MG/DL (ref 0.1–1.5)
BILIRUB UR QL STRIP.AUTO: NEGATIVE
BUN SERPL-MCNC: 14 MG/DL (ref 8–22)
CALCIUM ALBUM COR SERPL-MCNC: 8.8 MG/DL (ref 8.5–10.5)
CALCIUM SERPL-MCNC: 8.6 MG/DL (ref 8.5–10.5)
CHLORIDE SERPL-SCNC: 103 MMOL/L (ref 96–112)
CO2 SERPL-SCNC: 21 MMOL/L (ref 20–33)
COLOR UR: YELLOW
CREAT SERPL-MCNC: 0.72 MG/DL (ref 0.5–1.4)
CRP SERPL HS-MCNC: 0.59 MG/DL (ref 0–0.75)
ERYTHROCYTE [DISTWIDTH] IN BLOOD BY AUTOMATED COUNT: 46.2 FL (ref 35.9–50)
ERYTHROCYTE [SEDIMENTATION RATE] IN BLOOD BY WESTERGREN METHOD: 16 MM/HOUR (ref 0–20)
GFR SERPLBLD CREATININE-BSD FMLA CKD-EPI: 93 ML/MIN/1.73 M 2
GLOBULIN SER CALC-MCNC: 2.6 G/DL (ref 1.9–3.5)
GLUCOSE SERPL-MCNC: 100 MG/DL (ref 65–99)
GLUCOSE UR STRIP.AUTO-MCNC: NEGATIVE MG/DL
HCT VFR BLD AUTO: 42 % (ref 42–52)
HGB BLD-MCNC: 14.5 G/DL (ref 14–18)
INR PPP: 1.07 (ref 0.87–1.13)
KETONES UR STRIP.AUTO-MCNC: ABNORMAL MG/DL
LACTATE SERPL-SCNC: 1.4 MMOL/L (ref 0.5–2)
LACTATE SERPL-SCNC: 2.7 MMOL/L (ref 0.5–2)
LACTATE SERPL-SCNC: 3 MMOL/L (ref 0.5–2)
LACTATE SERPL-SCNC: 3.2 MMOL/L (ref 0.5–2)
LEUKOCYTE ESTERASE UR QL STRIP.AUTO: NEGATIVE
MCH RBC QN AUTO: 32.7 PG (ref 27–33)
MCHC RBC AUTO-ENTMCNC: 34.5 G/DL (ref 32.3–36.5)
MCV RBC AUTO: 94.6 FL (ref 81.4–97.8)
MICRO URNS: ABNORMAL
NITRITE UR QL STRIP.AUTO: NEGATIVE
PH UR STRIP.AUTO: 6 [PH] (ref 5–8)
PLATELET # BLD AUTO: 291 K/UL (ref 164–446)
PMV BLD AUTO: 10.9 FL (ref 9–12.9)
POTASSIUM SERPL-SCNC: 3.7 MMOL/L (ref 3.6–5.5)
PROT SERPL-MCNC: 6.4 G/DL (ref 6–8.2)
PROT UR QL STRIP: NEGATIVE MG/DL
PROTHROMBIN TIME: 14 SEC (ref 12–14.6)
RBC # BLD AUTO: 4.44 M/UL (ref 4.7–6.1)
RBC UR QL AUTO: NEGATIVE
SODIUM SERPL-SCNC: 137 MMOL/L (ref 135–145)
SP GR UR STRIP.AUTO: 1.03
UROBILINOGEN UR STRIP.AUTO-MCNC: 1 MG/DL
WBC # BLD AUTO: 17.3 K/UL (ref 4.8–10.8)

## 2024-07-26 PROCEDURE — 85027 COMPLETE CBC AUTOMATED: CPT

## 2024-07-26 PROCEDURE — 85652 RBC SED RATE AUTOMATED: CPT

## 2024-07-26 PROCEDURE — 99233 SBSQ HOSP IP/OBS HIGH 50: CPT | Performed by: STUDENT IN AN ORGANIZED HEALTH CARE EDUCATION/TRAINING PROGRAM

## 2024-07-26 PROCEDURE — 86041 ACETYLCHOLN RCPTR BNDNG ANTB: CPT

## 2024-07-26 PROCEDURE — 83605 ASSAY OF LACTIC ACID: CPT

## 2024-07-26 PROCEDURE — 700105 HCHG RX REV CODE 258: Performed by: STUDENT IN AN ORGANIZED HEALTH CARE EDUCATION/TRAINING PROGRAM

## 2024-07-26 PROCEDURE — 99222 1ST HOSP IP/OBS MODERATE 55: CPT | Performed by: PSYCHIATRY & NEUROLOGY

## 2024-07-26 PROCEDURE — 87040 BLOOD CULTURE FOR BACTERIA: CPT | Mod: 91

## 2024-07-26 PROCEDURE — 770001 HCHG ROOM/CARE - MED/SURG/GYN PRIV*

## 2024-07-26 PROCEDURE — 700111 HCHG RX REV CODE 636 W/ 250 OVERRIDE (IP): Mod: JZ | Performed by: STUDENT IN AN ORGANIZED HEALTH CARE EDUCATION/TRAINING PROGRAM

## 2024-07-26 PROCEDURE — 80053 COMPREHEN METABOLIC PANEL: CPT

## 2024-07-26 PROCEDURE — 81003 URINALYSIS AUTO W/O SCOPE: CPT

## 2024-07-26 PROCEDURE — 86140 C-REACTIVE PROTEIN: CPT

## 2024-07-26 PROCEDURE — 86043 ACETYLCHOLN RCPTR MODLG ANTB: CPT

## 2024-07-26 PROCEDURE — 36415 COLL VENOUS BLD VENIPUNCTURE: CPT

## 2024-07-26 PROCEDURE — 85610 PROTHROMBIN TIME: CPT

## 2024-07-26 PROCEDURE — 70355 PANORAMIC X-RAY OF JAWS: CPT

## 2024-07-26 PROCEDURE — A9270 NON-COVERED ITEM OR SERVICE: HCPCS | Performed by: STUDENT IN AN ORGANIZED HEALTH CARE EDUCATION/TRAINING PROGRAM

## 2024-07-26 PROCEDURE — 700102 HCHG RX REV CODE 250 W/ 637 OVERRIDE(OP): Performed by: STUDENT IN AN ORGANIZED HEALTH CARE EDUCATION/TRAINING PROGRAM

## 2024-07-26 PROCEDURE — 86042 ACETYLCHOLN RCPTR BLCKG ANTB: CPT

## 2024-07-26 RX ORDER — SODIUM CHLORIDE, SODIUM LACTATE, POTASSIUM CHLORIDE, AND CALCIUM CHLORIDE .6; .31; .03; .02 G/100ML; G/100ML; G/100ML; G/100ML
30 INJECTION, SOLUTION INTRAVENOUS ONCE
Status: COMPLETED | OUTPATIENT
Start: 2024-07-26 | End: 2024-07-26

## 2024-07-26 RX ORDER — SODIUM CHLORIDE, SODIUM LACTATE, POTASSIUM CHLORIDE, AND CALCIUM CHLORIDE .6; .31; .03; .02 G/100ML; G/100ML; G/100ML; G/100ML
500 INJECTION, SOLUTION INTRAVENOUS ONCE
Status: COMPLETED | OUTPATIENT
Start: 2024-07-27 | End: 2024-07-27

## 2024-07-26 RX ORDER — AMLODIPINE BESYLATE 5 MG/1
5 TABLET ORAL
Status: DISCONTINUED | OUTPATIENT
Start: 2024-07-26 | End: 2024-07-29 | Stop reason: HOSPADM

## 2024-07-26 RX ADMIN — AMPICILLIN AND SULBACTAM 3 G: 1; 2 INJECTION, POWDER, FOR SOLUTION INTRAMUSCULAR; INTRAVENOUS at 23:55

## 2024-07-26 RX ADMIN — SODIUM CHLORIDE, POTASSIUM CHLORIDE, SODIUM LACTATE AND CALCIUM CHLORIDE 2000 ML: 600; 310; 30; 20 INJECTION, SOLUTION INTRAVENOUS at 15:30

## 2024-07-26 RX ADMIN — ENOXAPARIN SODIUM 40 MG: 100 INJECTION SUBCUTANEOUS at 18:43

## 2024-07-26 RX ADMIN — AMPICILLIN AND SULBACTAM 3 G: 1; 2 INJECTION, POWDER, FOR SOLUTION INTRAMUSCULAR; INTRAVENOUS at 05:50

## 2024-07-26 RX ADMIN — AMPICILLIN AND SULBACTAM 3 G: 1; 2 INJECTION, POWDER, FOR SOLUTION INTRAMUSCULAR; INTRAVENOUS at 00:09

## 2024-07-26 RX ADMIN — AMLODIPINE BESYLATE 5 MG: 5 TABLET ORAL at 08:07

## 2024-07-26 RX ADMIN — AMPICILLIN AND SULBACTAM 3 G: 1; 2 INJECTION, POWDER, FOR SOLUTION INTRAMUSCULAR; INTRAVENOUS at 18:47

## 2024-07-26 RX ADMIN — AMPICILLIN AND SULBACTAM 3 G: 1; 2 INJECTION, POWDER, FOR SOLUTION INTRAMUSCULAR; INTRAVENOUS at 11:59

## 2024-07-26 RX ADMIN — ENOXAPARIN SODIUM 40 MG: 100 INJECTION SUBCUTANEOUS at 00:12

## 2024-07-26 SDOH — ECONOMIC STABILITY: TRANSPORTATION INSECURITY
IN THE PAST 12 MONTHS, HAS THE LACK OF TRANSPORTATION KEPT YOU FROM MEDICAL APPOINTMENTS OR FROM GETTING MEDICATIONS?: NO

## 2024-07-26 SDOH — ECONOMIC STABILITY: TRANSPORTATION INSECURITY
IN THE PAST 12 MONTHS, HAS LACK OF RELIABLE TRANSPORTATION KEPT YOU FROM MEDICAL APPOINTMENTS, MEETINGS, WORK OR FROM GETTING THINGS NEEDED FOR DAILY LIVING?: NO

## 2024-07-26 ASSESSMENT — COGNITIVE AND FUNCTIONAL STATUS - GENERAL
SUGGESTED CMS G CODE MODIFIER DAILY ACTIVITY: CH
SUGGESTED CMS G CODE MODIFIER MOBILITY: CH
DAILY ACTIVITIY SCORE: 24
MOBILITY SCORE: 24

## 2024-07-26 ASSESSMENT — ENCOUNTER SYMPTOMS
SPEECH CHANGE: 0
WEAKNESS: 0
FOCAL WEAKNESS: 0
SENSORY CHANGE: 0

## 2024-07-26 ASSESSMENT — LIFESTYLE VARIABLES
ON A TYPICAL DAY WHEN YOU DRINK ALCOHOL HOW MANY DRINKS DO YOU HAVE: 0
TOTAL SCORE: 0
ALCOHOL_USE: NO
CONSUMPTION TOTAL: NEGATIVE
DOES PATIENT WANT TO STOP DRINKING: NO
TOTAL SCORE: 0
EVER HAD A DRINK FIRST THING IN THE MORNING TO STEADY YOUR NERVES TO GET RID OF A HANGOVER: NO
TOTAL SCORE: 0
EVER FELT BAD OR GUILTY ABOUT YOUR DRINKING: NO
HOW MANY TIMES IN THE PAST YEAR HAVE YOU HAD 5 OR MORE DRINKS IN A DAY: 0
HAVE YOU EVER FELT YOU SHOULD CUT DOWN ON YOUR DRINKING: NO
AVERAGE NUMBER OF DAYS PER WEEK YOU HAVE A DRINK CONTAINING ALCOHOL: 0
HAVE PEOPLE ANNOYED YOU BY CRITICIZING YOUR DRINKING: NO

## 2024-07-26 ASSESSMENT — FIBROSIS 4 INDEX
FIB4 SCORE: 2.01
FIB4 SCORE: 2.01

## 2024-07-26 ASSESSMENT — PATIENT HEALTH QUESTIONNAIRE - PHQ9
SUM OF ALL RESPONSES TO PHQ9 QUESTIONS 1 AND 2: 0
1. LITTLE INTEREST OR PLEASURE IN DOING THINGS: NOT AT ALL
2. FEELING DOWN, DEPRESSED, IRRITABLE, OR HOPELESS: NOT AT ALL

## 2024-07-26 ASSESSMENT — SOCIAL DETERMINANTS OF HEALTH (SDOH)
WITHIN THE PAST 12 MONTHS, YOU WORRIED THAT YOUR FOOD WOULD RUN OUT BEFORE YOU GOT THE MONEY TO BUY MORE: NEVER TRUE
WITHIN THE LAST YEAR, HAVE TO BEEN RAPED OR FORCED TO HAVE ANY KIND OF SEXUAL ACTIVITY BY YOUR PARTNER OR EX-PARTNER?: NO
WITHIN THE LAST YEAR, HAVE YOU BEEN KICKED, HIT, SLAPPED, OR OTHERWISE PHYSICALLY HURT BY YOUR PARTNER OR EX-PARTNER?: NO
WITHIN THE LAST YEAR, HAVE YOU BEEN HUMILIATED OR EMOTIONALLY ABUSED IN OTHER WAYS BY YOUR PARTNER OR EX-PARTNER?: NO
WITHIN THE PAST 12 MONTHS, THE FOOD YOU BOUGHT JUST DIDN'T LAST AND YOU DIDN'T HAVE MONEY TO GET MORE: NEVER TRUE
WITHIN THE LAST YEAR, HAVE YOU BEEN AFRAID OF YOUR PARTNER OR EX-PARTNER?: NO
IN THE PAST 12 MONTHS, HAS THE ELECTRIC, GAS, OIL, OR WATER COMPANY THREATENED TO SHUT OFF SERVICE IN YOUR HOME?: NO

## 2024-07-26 ASSESSMENT — PAIN DESCRIPTION - PAIN TYPE
TYPE: ACUTE PAIN
TYPE: ACUTE PAIN

## 2024-07-26 NOTE — PROGRESS NOTES
4 Eyes Skin Assessment Completed by SHAWN Dent and SHAWN Lou.    Head Scab  Ears WDL  Nose WDL  Mouth WDL redness around mouth/chin  Neck WDL  Breast/Chest WDL  Shoulder Blades WDL  Spine WDL  (R) Arm/Elbow/Hand WDL  (L) Arm/Elbow/Hand WDL  Abdomen Scab/discoloration  Groin WDL  Scrotum/Coccyx/Buttocks Scab  (R) Leg WDL- dry/flaky  (L) Leg dry/flaky, Scabbing/discoloration on hip  (R) Heel/Foot/Toe WDL - dry/flaky  (L) Heel/Foot/Toe WDL - dry/flaky    Generalized scabbing/discoloration spots- mostly on abdomen and L hip      Devices In Places N/A      Interventions In Place Pillows    Possible Skin Injury No    Pictures Uploaded Into Epic N/A  Wound Consult Placed N/A  RN Wound Prevention Protocol Ordered No

## 2024-07-26 NOTE — ED PROVIDER NOTES
"ED Provider Note    CHIEF COMPLAINT  Chief Complaint   Patient presents with    Sent by MD KNOX/ANNIE    Aguila Botello is a 79 y.o. male who presents for evaluation of hypertension and left-sided eye drooping, sent by his dentist.  The patient went for a tooth extraction today, was found to be so hypertensive that they did not perform the extraction.  The patient reports for the last week he has been unable to open his left eye.  He reports that he also has double vision.  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  He has now resolved left-sided maxillary dental pain.  No fever.  No headache.  No weakness numbness or tingling of the extremities.    PAST MEDICAL HISTORY       SURGICAL HISTORY  patient denies any surgical history    FAMILY HISTORY  History reviewed. No pertinent family history.    SOCIAL HISTORY  Social History     Tobacco Use    Smoking status: Never    Smokeless tobacco: Never   Vaping Use    Vaping status: Never Used   Substance and Sexual Activity    Alcohol use: Not Currently    Drug use: Never    Sexual activity: Not on file       CURRENT MEDICATIONS  Home Medications       Reviewed by Kate Chaney R.N. (Registered Nurse) on 07/25/24 at 1605  Med List Status: Partial     Medication Last Dose Status        Patient Ruben Taking any Medications                         Audit from Redirected Encounters    **Home medications have not yet been reviewed for this encounter**         ALLERGIES  Allergies   Allergen Reactions    Penicillin G Rash     \"When I was a child. According to my mother I got rash\"       PHYSICAL EXAM  VITAL SIGNS: BP (!) 216/102   Pulse (!) 102   Temp 37.1 °C (98.8 °F) (Temporal)   Resp 16   Ht 1.702 m (5' 7\")   Wt 95.2 kg (209 lb 14.1 oz)   SpO2 96%   BMI 32.87 kg/m²    Constitutional: Well appearing patient in no acute " distress.  Awake and alert, not toxic nor ill in appearance.  HENT: Normocephalic, no obvious evidence of acute trauma.  Eyes: Left ptosis.  Left eye is down and out.  Left eye will not adduct past midline.  Pupils are symmetric, reactive to direct and indirect light.  No facial drooping, normal forehead.  Thorax & Lungs: Normal nonlabored respirations. I appreciate no wheezing, rhonchi or rales. There is normal air movement.  Upon cardiac ascultation I appreciate a regular heart rhythm and a normal rate.   Abdomen: The abdomen is not visibly distended. Upon palpation, I find it to be without tenderness.  No mass appreciated.  Skin: The exposed portions of skin reveal no obvious rash or other abnormalities.  Extremities/Musculoskeletal: No obvious sign of acute trauma. No asymmetric calf tenderness or edema.   Neurologic: Awake alert and fully oriented.  Cranial nerve examination revealing 3rd nerve palsy.  Normal and symmetric upper and lower extremity motor and sensory function bilaterally    EKG/LABS  Results for orders placed or performed during the hospital encounter of 07/25/24   CBC WITH DIFFERENTIAL   Result Value Ref Range    WBC 14.5 (H) 4.8 - 10.8 K/uL    RBC 4.82 4.70 - 6.10 M/uL    Hemoglobin 15.7 14.0 - 18.0 g/dL    Hematocrit 46.6 42.0 - 52.0 %    MCV 96.7 81.4 - 97.8 fL    MCH 32.6 27.0 - 33.0 pg    MCHC 33.7 32.3 - 36.5 g/dL    RDW 47.2 35.9 - 50.0 fL    Platelet Count 312 164 - 446 K/uL    MPV 10.8 9.0 - 12.9 fL    Neutrophils-Polys 73.00 (H) 44.00 - 72.00 %    Lymphocytes 19.70 (L) 22.00 - 41.00 %    Monocytes 6.00 0.00 - 13.40 %    Eosinophils 0.40 0.00 - 6.90 %    Basophils 0.60 0.00 - 1.80 %    Immature Granulocytes 0.30 0.00 - 0.90 %    Nucleated RBC 0.00 0.00 - 0.20 /100 WBC    Neutrophils (Absolute) 10.59 (H) 1.82 - 7.42 K/uL    Lymphs (Absolute) 2.86 1.00 - 4.80 K/uL    Monos (Absolute) 0.87 (H) 0.00 - 0.85 K/uL    Eos (Absolute) 0.06 0.00 - 0.51 K/uL    Baso (Absolute) 0.09 0.00 - 0.12  K/uL    Immature Granulocytes (abs) 0.05 0.00 - 0.11 K/uL    NRBC (Absolute) 0.00 K/uL   COMP METABOLIC PANEL   Result Value Ref Range    Sodium 139 135 - 145 mmol/L    Potassium 4.3 3.6 - 5.5 mmol/L    Chloride 102 96 - 112 mmol/L    Co2 22 20 - 33 mmol/L    Anion Gap 15.0 7.0 - 16.0    Glucose 118 (H) 65 - 99 mg/dL    Bun 13 8 - 22 mg/dL    Creatinine 0.98 0.50 - 1.40 mg/dL    Calcium 9.3 8.5 - 10.5 mg/dL    Correct Calcium 9.1 8.5 - 10.5 mg/dL    AST(SGOT) 21 12 - 45 U/L    ALT(SGPT) 7 2 - 50 U/L    Alkaline Phosphatase 83 30 - 99 U/L    Total Bilirubin 0.9 0.1 - 1.5 mg/dL    Albumin 4.3 3.2 - 4.9 g/dL    Total Protein 7.7 6.0 - 8.2 g/dL    Globulin 3.4 1.9 - 3.5 g/dL    A-G Ratio 1.3 g/dL   TROPONIN   Result Value Ref Range    Troponin T 21 (H) 6 - 19 ng/L   PROTHROMBIN TIME (INR)   Result Value Ref Range    PT 13.5 12.0 - 14.6 sec    INR 1.02 0.87 - 1.13   APTT   Result Value Ref Range    APTT 25.1 24.7 - 36.0 sec   ESTIMATED GFR   Result Value Ref Range    GFR (CKD-EPI) 78 >60 mL/min/1.73 m 2      I have independently interpreted this EKG    RADIOLOGY/PROCEDURES   I have independently interpreted the diagnostic imaging associated with this visit and am waiting the final reading from the radiologist.   My preliminary interpretation is as follows: No ICH    Radiologist interpretation:  CT-CTV HEAD WITH & W/O POST PROCESS   Final Result      There is no evidence of dural venous sinus thrombosis.      CT-CTA NECK WITH & W/O-POST PROCESSING   Final Result      1.  CT angiogram of the neck within normal limits.   2.  Possible 3 mm right distal internal carotid artery aneurysm versus infundibulum. Finding could be further evaluated with conventional angiography on a nonemergent basis.      Findings were conveyed to Dr. KAREN GARCIA on 7/25/2024 7:51 PM via electronic messaging.      CT-CTA HEAD WITH & W/O-POST PROCESS   Final Result   Addendum (preliminary) 1 of 1   Questionable 3 mm outpouching in the right  distal internal carotid artery    could be aneurysm or prominent infundibulum.      Final         1. No hemodynamically significant narrowing of the major intracranial vessels.          COURSE & MEDICAL DECISION MAKING    ASSESSMENT, COURSE AND PLAN  Care Narrative: 79-year-old male with a 3rd nerve palsy x 1 week, in the setting of a dental infection for which she was supposed to have a tooth extracted today but because of his hypertension he was sent here.  Arrived with blood pressure 216/102.  Denies any history of hypertension.  Denies any weakness numbness or tingling of extremities with no focal neurologic deficit found other than the 3rd nerve palsy.  No chest pain.  CTA head and neck with CTV of the head will be obtained.  Blood work.  He will be treated with hydralazine.  He will be reevaluated      Imaging is essentially within normal limits, there is no causative lesion identified.  CTV is negative for evidence of cavernous venous thrombosis.  There is a possible internal carotid aneurysm.  At this point, he received IV hydralazine, remained significantly hypertensive in the 200 systolic range, I then ordered a dose of IV labetalol.  The patient will be admitted to the hospital for further evaluation and care         DISPOSITION AND DISCUSSIONS  I have discussed management of the patient with the following physicians and PARI's: Dr. Gomez, Admitting hospitalist      FINAL DIAGNOSIS  1. 3rd nerve palsy, complete, left    2. Hypertensive urgency           Electronically signed by: Jemal Lopez M.D., 7/25/2024 5:49 PM

## 2024-07-26 NOTE — ED NOTES
Med rec complete per patient  Allergies reviewed.     Outpatient antibiotics in the last 30 days? Yes  Patient has completed 3 courses of Keflex starting 6/13/24     Anticoagulants taken in the last 14 days? No    Pharmacy patient utilizes: [facility]

## 2024-07-26 NOTE — CONSULTS
"Dental Infection Consult  [facility]    ID: Aguila Botello is a 79 y.o. male who presented 7/25/2024 with hypertension patient states that he has been experiencing chronic dental abscess for some time now.  Has been on 3 courses of antibiotics.  He presented to his dentist today for dental extraction.  He was found to be hypertensive, so he was subsequently sent to the emergency department.  He also states that his dentist noticed that he had left eye ptosis, and disconjugate gaze.  The patient states that he has noticed the symptoms for approximately 1 week now.  In emergency department, patient was found to be hypertensive with systolic blood pressure greater than 200.  Was given a dose of IV hydralazine.  It is CTA head and neck and CTV which were all unremarkable.     PMH: History reviewed. No pertinent past medical history.  Medications:   Current Facility-Administered Medications   Medication Dose Route Frequency Provider Last Rate Last Admin    amLODIPine (Norvasc) tablet 5 mg  5 mg Oral Q DAY Martin Kim M.D.   5 mg at 07/26/24 0807    labetalol (Normodyne/Trandate) injection 10 mg  10 mg Intravenous Once Jemal Lopez M.D.        enoxaparin (Lovenox) inj 40 mg  40 mg Subcutaneous DAILY AT 1800 Girish Gomez M.D.   40 mg at 07/26/24 0012    acetaminophen (Tylenol) tablet 650 mg  650 mg Oral Q6HRS PRN Girish Gomez M.D.        hydrALAZINE (Apresoline) injection 10 mg  10 mg Intravenous Q4HRS PRDSE Gomez M.D.        ondansetron (Zofran) syringe/vial injection 4 mg  4 mg Intravenous Q4HRS PRN Girish Gomez M.D.        ondansetron (Zofran ODT) dispertab 4 mg  4 mg Oral Q4HRS PRDES Gomez M.D.        ampicillin/sulbactam (Unasyn) 3 g in  mL IVPB  3 g Intravenous Q6HRS Girish Gomez M.D.   Stopped at 07/26/24 1229     Allergies:   Allergies   Allergen Reactions    Penicillin G Rash     \"When I was a child. According to my mother I got rash\" "     Surgical history: History reviewed. No pertinent surgical history.  Social history:   Social History     Social History Narrative    Not on file     Family history: History reviewed. No pertinent family history.    ROS: All systems reviewed and are negative other than those noted in HPI and PMH.    Vitals:  Vitals:    07/26/24 0748   BP: 134/63   Pulse: 76   Resp: 16   Temp: 36.6 °C (97.8 °F)   SpO2: 93%       Labs:  Recent Labs     07/25/24  1755 07/26/24  0340   WBC 14.5* 17.3*   RBC 4.82 4.44*   HEMOGLOBIN 15.7 14.5   HEMATOCRIT 46.6 42.0   MCV 96.7 94.6   MCH 32.6 32.7   RDW 47.2 46.2   PLATELETCT 312 291   MPV 10.8 10.9   NEUTSPOLYS 73.00*  --    LYMPHOCYTES 19.70*  --    MONOCYTES 6.00  --    EOSINOPHILS 0.40  --    BASOPHILS 0.60  --      Recent Labs     07/25/24  1755 07/26/24  0340   SODIUM 139 137   POTASSIUM 4.3 3.7   CHLORIDE 102 103   CO2 22 21   GLUCOSE 118* 100*   BUN 13 14       Imaging: Independent review of maxillofacial CT w/contrast and panoramic x-ray with the following findings: deep distal caries teeth #4, 19 with small periapical lucencies associated with root apicies representing chronic periapical abscesses. No perforation of bone or soft tissue abscesses present.     Assessment: 79 y.o. male with carious teeth #4, 19 with chronic periapical abscess, leukocytosis and CN III.     Plan:  Recommend extraction of teeth #4, 19 in the OR due to poorly controlled HTN while patient is admitted  Plan for OR tomorrow morning (approx 10:30am) for extraction of teeth #4, 19 and any other indicated teeth  Please make NPO at midnight      Ross Woodard, DMD  [facility]

## 2024-07-26 NOTE — H&P
Hospital Medicine History & Physical Note    Date of Service  7/25/2024    Primary Care Physician  Pcp Pt States None        Code Status  Full Code    Chief Complaint  Chief Complaint   Patient presents with    Sent by MD       History of Presenting Illness  Aguila Botello is a 79 y.o. male who presented 7/25/2024 with hypertension patient states that he has been experiencing chronic dental abscess for some time now.  Has been on 3 courses of antibiotics.  He presented to his dentist today for dental extraction.  He was found to be hypertensive, so he was subsequently sent to the emergency department.  He also states that his dentist noticed that he had left eye ptosis, and disconjugate gaze.  The patient states that he has noticed the symptoms for approximately 1 week now.  In emergency department, patient was found to be hypertensive with systolic blood pressure greater than 200.  Was given a dose of IV hydralazine.  It is CTA head and neck and CTV which were all unremarkable.    I discussed the plan of care with patient and family.    Review of Systems  Review of Systems   Constitutional:  Negative for chills and fever.   HENT:  Negative for congestion, ear discharge, ear pain, nosebleeds and sinus pain.    Eyes:  Negative for blurred vision, double vision, photophobia, pain and discharge.        Ptosis    Respiratory:  Negative for hemoptysis, sputum production and shortness of breath.    Cardiovascular:  Negative for chest pain, palpitations and orthopnea.   Gastrointestinal:  Negative for abdominal pain, diarrhea, nausea and vomiting.   Genitourinary:  Negative for frequency, hematuria and urgency.   Musculoskeletal:  Negative for back pain and neck pain.   Skin:  Negative for itching and rash.   Neurological:  Negative for dizziness and headaches.   Psychiatric/Behavioral:  Negative for hallucinations and substance abuse. The patient is not nervous/anxious and does not have insomnia.        Past Medical  "History   has no past medical history on file.    Surgical History   has no past surgical history on file.     Family History  family history is not on file.   Family history reviewed with patient. There is no family history that is pertinent to the chief complaint.     Social History   reports that he has never smoked. He has never used smokeless tobacco. He reports that he does not currently use alcohol. He reports that he does not use drugs.    Allergies  Allergies   Allergen Reactions    Penicillin G Rash     \"When I was a child. According to my mother I got rash\"       Medications  None       Physical Exam  Temp:  [37.1 °C (98.8 °F)] 37.1 °C (98.8 °F)  Pulse:  [] 98  Resp:  [16-20] 20  BP: (176-216)/() 176/79  SpO2:  [95 %-98 %] 98 %  Blood Pressure : (!) 176/79   Temperature: 37.1 °C (98.8 °F)   Pulse: 98   Respiration: 20   Pulse Oximetry: 98 %       Physical Exam  Constitutional:       General: He is not in acute distress.     Appearance: Normal appearance. He is normal weight. He is not ill-appearing, toxic-appearing or diaphoretic.   HENT:      Head: Normocephalic and atraumatic.      Mouth/Throat:      Mouth: Mucous membranes are moist.   Eyes:      Pupils: Pupils are equal, round, and reactive to light.      Comments: Left eye ptosis, disconjugate gaze, left eye unable to abduct,   Cardiovascular:      Rate and Rhythm: Normal rate and regular rhythm.      Pulses: Normal pulses.      Heart sounds: Normal heart sounds. No murmur heard.     No friction rub. No gallop.   Pulmonary:      Effort: Pulmonary effort is normal. No respiratory distress.      Breath sounds: Normal breath sounds. No stridor. No wheezing, rhonchi or rales.   Chest:      Chest wall: No tenderness.   Abdominal:      General: There is no distension.      Palpations: There is no mass.      Tenderness: There is no abdominal tenderness. There is no right CVA tenderness, left CVA tenderness, guarding or rebound.      Hernia: No " "hernia is present.   Musculoskeletal:         General: No swelling, tenderness, deformity or signs of injury.      Right lower leg: No edema.      Left lower leg: No edema.   Skin:     General: Skin is warm and dry.      Capillary Refill: Capillary refill takes less than 2 seconds.      Coloration: Skin is not jaundiced or pale.      Findings: No bruising, erythema, lesion or rash.   Neurological:      General: No focal deficit present.      Mental Status: He is alert and oriented to person, place, and time. Mental status is at baseline.      Cranial Nerves: No cranial nerve deficit.      Sensory: No sensory deficit.      Motor: No weakness.      Coordination: Coordination normal.   Psychiatric:         Mood and Affect: Mood normal.         Laboratory:  Recent Labs     07/25/24  1755   WBC 14.5*   RBC 4.82   HEMOGLOBIN 15.7   HEMATOCRIT 46.6   MCV 96.7   MCH 32.6   MCHC 33.7   RDW 47.2   PLATELETCT 312   MPV 10.8     Recent Labs     07/25/24  1755   SODIUM 139   POTASSIUM 4.3   CHLORIDE 102   CO2 22   GLUCOSE 118*   BUN 13   CREATININE 0.98   CALCIUM 9.3     Recent Labs     07/25/24  1755   ALTSGPT 7   ASTSGOT 21   ALKPHOSPHAT 83   TBILIRUBIN 0.9   GLUCOSE 118*     Recent Labs     07/25/24  1755   APTT 25.1   INR 1.02     No results for input(s): \"NTPROBNP\" in the last 72 hours.      Recent Labs     07/25/24  1755   TROPONINT 21*       Imaging:  CT-CTV HEAD WITH & W/O POST PROCESS   Final Result      There is no evidence of dural venous sinus thrombosis.      CT-CTA NECK WITH & W/O-POST PROCESSING   Final Result      1.  CT angiogram of the neck within normal limits.   2.  Possible 3 mm right distal internal carotid artery aneurysm versus infundibulum. Finding could be further evaluated with conventional angiography on a nonemergent basis.      Findings were conveyed to Dr. KAREN GARCIA on 7/25/2024 7:51 PM via electronic messaging.      CT-CTA HEAD WITH & W/O-POST PROCESS   Final Result   Addendum (preliminary) " 1 of 1   Questionable 3 mm outpouching in the right distal internal carotid artery    could be aneurysm or prominent infundibulum.      Final         1. No hemodynamically significant narrowing of the major intracranial vessels.      CT-MAXILLOFACIAL W/O PLUS RECONS    (Results Pending)       X-Ray:  I have personally reviewed the images and compared with prior images.  EKG:  I have personally reviewed the images and compared with prior images.    Assessment/Plan:  Justification for Admission Status  I anticipate this patient will require at least two midnights for appropriate medical management, necessitating inpatient admission because hypertensive urgency    Patient will need a Telemetry bed on EMERGENCY service .  The need is secondary to hypertensive urgency.    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  He got a dose of IV hydralazine while in the emergency department.  His systolic blood pressure improved down to the 170s  Avoid overcorrection of his blood pressure more than 25%  IV hydralazine as needed ordered.  Blood pressure in the 180s.  Patient should be discharged home on oral antihypertensives    Dental abscess  Assessment & Plan  Patient reports history of left-sided dental abscess.  Was seen by dentist today for dental extraction.  He has completed 3 courses of antibiotics.  Dental extraction was not performed given the fact that he was extremely hypertensive.  He does have a leukocytosis while in the emergency department.  Follow-up CT maxillofacial.    Advanced care planning/counseling discussion  Assessment & Plan  I spent 17 minutes at bedside with patient discussing work-up, results, diagnosis, prognosis.  I  discussed with him in regard to his new cranial nerve palsy as well as his hypertensive urgency.  Patient said he would like all life-saving measures including CPR and intubation.  This conversation was had at bedside all the Emergency Department.  Patient son was present.  CODE STATUS discussed with patient and wants to be 18          VTE prophylaxis: enoxaparin ppx

## 2024-07-26 NOTE — CARE PLAN
The patient is Watcher - Medium risk of patient condition declining or worsening    Problem: Knowledge Deficit - Standard  Goal: Patient and family/care givers will demonstrate understanding of plan of care, disease process/condition, diagnostic tests and medications  Outcome: Progressing     Shift Goals  Clinical Goals: monitor neuros  Patient Goals: sleep  Family Goals: updated on poc    Progress made toward(s) clinical / shift goals: POC discussed with pt    Patient is not progressing towards the following goals:

## 2024-07-26 NOTE — ASSESSMENT & PLAN NOTE
Patient has a 1 week history of cranial nerve III palsy and ptosis   CTA head and neck and CTV unremarkable  Inflammatory markers unremarkable  MRI brain, orbits unremarkable.  Plan is to get LP for further evaluation of Infectious/inflammatory process

## 2024-07-26 NOTE — PROGRESS NOTES
[facility] Medicine Daily Progress Note    Date of Service  7/26/2024    Chief Complaint  Aguila Botello is a 79 y.o. male admitted 7/25/2024 with tunnel palsy    Hospital Course  79 male with past medical history of hypertension, dental caries referred by his dentist for hypertension and left eye ptosis.  Patient was seen by his dentist for dental extraction and noted to have significant elevated blood pressure and ptosis for which he was referred to ED for further evaluation.  On arrival to ED noted to have odontogenic apical abscess on CT maxillofacial.  CT head CTA neck negative for acute stroke/hemorrhage.  Neurology Dr. Kyle has consulted for evaluation.  Patient is scheduled for I&D of dental abscess tomorrow      Interval Problem Update    7/26/2024  Seen and examined bedside  Vital remained stable  Currently denies any discomfort  Labs reviewed noted to have leukocytosis white count 17.3, elevated lactic acid 3.2.  CT head/CT negative reviewed no acute stroke/hemorrhage  CT maxillofacial noted to have odontogenic abscess      Telemetry monitor  Sepsis protocol initiated  Continue on IV antibiotics, follow blood culture  MRI brain  Neurochecks every 4 hours  Repeat BMP in a.m. to monitor electrolytes   Repeat CBC in a.m. to monitor white count and hemoglobin  Need close monitoring of blood counts, electrolytes and renal function due to potential of organ dysfunction due to ongoing sepsis.  Requiring IV antibiotics, need close monitoring for toxicity  Scheduled for dental extraction tomorrow.  Care discussed with oral surgery   Case discussed with neurology Dr. Kyle    High risk of deterioration into severe sepsis.  Need close monitoring          I have discussed this patient's plan of care and discharge plan at IDT rounds today with Case Management, Nursing, Nursing leadership, and other members of the IDT team.    Consultants/Specialty  infectious disease and neurology    Code Status  Full  Code    Disposition  The patient is not medically cleared for discharge to home or a post-acute facility.  Anticipate discharge to: home with close outpatient follow-up    I have placed the appropriate orders for post-discharge needs.    Review of Systems  Review of Systems   Eyes:  Blurred vision: ptosis.   Musculoskeletal:  Positive for joint pain.   Neurological:  Negative for sensory change, speech change, focal weakness and weakness.        Physical Exam  Temp:  [36.6 °C (97.8 °F)-37.1 °C (98.8 °F)] 36.6 °C (97.8 °F)  Pulse:  [] 76  Resp:  [14-20] 16  BP: (134-216)/() 134/63  SpO2:  [93 %-98 %] 93 %    Physical Exam  Constitutional:       Appearance: Normal appearance.   HENT:      Head:      Comments: Dental caries  Cardiovascular:      Rate and Rhythm: Normal rate and regular rhythm.      Pulses: Normal pulses.      Heart sounds: Normal heart sounds.   Pulmonary:      Effort: Pulmonary effort is normal.   Abdominal:      General: Abdomen is flat.   Neurological:      Mental Status: He is alert and oriented to person, place, and time.      Cranial Nerves: Cranial nerve deficit present.      Comments: Left eye ptosis, disconjugate gaze, normal sized pupils, reactive to light.         Fluids    Intake/Output Summary (Last 24 hours) at 7/26/2024 1342  Last data filed at 7/26/2024 0343  Gross per 24 hour   Intake --   Output 200 ml   Net -200 ml       Laboratory  Recent Labs     07/25/24  1755 07/26/24  0340   WBC 14.5* 17.3*   RBC 4.82 4.44*   HEMOGLOBIN 15.7 14.5   HEMATOCRIT 46.6 42.0   MCV 96.7 94.6   MCH 32.6 32.7   MCHC 33.7 34.5   RDW 47.2 46.2   PLATELETCT 312 291   MPV 10.8 10.9     Recent Labs     07/25/24  1755 07/26/24  0340   SODIUM 139 137   POTASSIUM 4.3 3.7   CHLORIDE 102 103   CO2 22 21   GLUCOSE 118* 100*   BUN 13 14   CREATININE 0.98 0.72   CALCIUM 9.3 8.6     Recent Labs     07/25/24  1755   APTT 25.1   INR 1.02               Imaging  [id number]   Final Result      1.   Thin rim of lucency about the second from last left mandibular molar which may represent an early odontogenic apical abscess.   2.  Multiple missing teeth.      CT-MAXILLOFACIAL W/O PLUS RECONS   Final Result         1.  No enhancing soft tissue fluid collection identified to indicate abscess.   2.  Bony lucency adjacent to the tip of the right maxillary second premolar, likely related to odontogenic apical abscess.   3.  Atherosclerosis      CT-CTV HEAD WITH & W/O POST PROCESS   Final Result      There is no evidence of dural venous sinus thrombosis.      CT-CTA NECK WITH & W/O-POST PROCESSING   Final Result      1.  CT angiogram of the neck within normal limits.   2.  Possible 3 mm right distal internal carotid artery aneurysm versus infundibulum. Finding could be further evaluated with conventional angiography on a nonemergent basis.      Findings were conveyed to Dr. KAREN GARCIA on 7/25/2024 7:51 PM via electronic messaging.      CT-CTA HEAD WITH & W/O-POST PROCESS   Final Result   Addendum (preliminary) 1 of 1   Questionable 3 mm outpouching in the right distal internal carotid artery    could be aneurysm or prominent infundibulum.      Final         1. No hemodynamically significant narrowing of the major intracranial vessels.      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  Assessment & Plan  Patient has a 1 week history of cranial nerve III palsy and ptosis   CTA head and neck and CTV unremarkable  Follow-up MRI brain  Neurology has been consulted    Sepsis with organ dysfunction (HCC)  Assessment & Plan  This is Sepsis Not present on admission  SIRS criteria identified on my evaluation include: Tachycardia, with heart rate greater than 90 BPM and Leukocytosis, with WBC greater than 12,000  Clinical indicators of end organ dysfunction include Lactic Acid greater than 2  Source is dental abscess  Sepsis protocol initiated  Crystalloid Fluid Administration: Fluid  resuscitation ordered per standard protocol - 30 mL/kg per current or ideal body weight  IV antibiotics as appropriate for source of sepsis  Reassessment: I have reassessed the patient's hemodynamic status    Dental abscess  Assessment & Plan  CT maxillofacial noted to have odontogenic abscess    Repeat BMP in a.m. to monitor electrolytes   Repeat CBC in a.m. to monitor white count and hemoglobin  Need close monitoring of blood counts, electrolytes and renal function due to potential of organ dysfunction due to ongoing sepsis.  Requiring IV antibiotics, need close monitoring for toxicity  Scheduled for dental extraction tomorrow.  Care discussed with oral surgery     Hypertensive urgency  Assessment & Plan  Patient present with systolic blood pressure than 200.  He got a dose of IV hydralazine while in the emergency department.  His systolic blood pressure improved down to the 170s  Avoid overcorrection of his blood pressure more than 25%  IV hydralazine as needed ordered.  Blood pressure in the 180s.  Patient should be discharged home on oral antihypertensives    Advanced care planning/counseling discussion  Assessment & Plan  I spent 17 minutes at bedside with patient discussing work-up, results, diagnosis, prognosis.  I discussed with him in regard to his new cranial nerve palsy as well as his hypertensive urgency.  Patient said he would like all life-saving measures including CPR and intubation.  This conversation was had at bedside all the Emergency Department.  Patient son was present.  CODE STATUS discussed with patient and wants to be 18           VTE prophylaxis: lovenox    I have performed a physical exam and reviewed and updated ROS and Plan today (7/26/2024). In review of yesterday's note (7/25/2024), there are no changes except as documented above.

## 2024-07-26 NOTE — CONSULTS
"Neurology Initial Consult H&P  Neurohospitalist Service, [facility]    Referring Physician: Martin Kim M.D.    Chief Complaint   Patient presents with    Sent by MD KNOX: Aguila Botello is a 79 y.o. man  presenting with hypertensive urgency, dental abscess and left eyelid drooping for whom neurology has been consulted for subacute third cranial nerve palsy (OS).     Patient mentions that he has experienced left eye surgery in childhood for which she is unable to provide details.  Sounds like may have been for strabismus.  Since that time he has had poor vision in the left eye.  He believes that the vision in the left eye is unchanged as well as his ability to move the left eye.  Denies any double vision.  No eye pain.  The son however believes that the left eye is not moving as previously was for example mentions that it was able to move inward.  Now only able to move the left eye outward.    Patient endorses that the left eyelid his become droopy over the last week.  Symptoms are of gradual onset but progressive to the point where the eyelid is completely closed.  There is no fatigability.  Denies any bulbar or respiratory symptoms.  No weakness or sensory changes in other places.  Has a pin in his right hip that limits range of motion.    No headache or other neurologic symptoms at this time.  Oral surgery is planned for tomorrow.    Review of systems: In addition to what is detailed in the HPI above, all other systems reviewed and are negative.    Past Medical History:    has no past medical history on file.    FHx:  family history is not on file.    SHx:   reports that he has never smoked. He has never used smokeless tobacco. He reports that he does not currently use alcohol. He reports that he does not use drugs.    Allergies:  Allergies   Allergen Reactions    Penicillin G Rash     \"When I was a child. According to my mother I got rash\"       Medications:    Current " "Facility-Administered Medications:     amLODIPine (Norvasc) tablet 5 mg, 5 mg, Oral, Q DAY, Martin Kim M.D., 5 mg at 07/26/24 0807    LR (Bolus) infusion 1,983 mL, 30 mL/kg (Ideal), Intravenous, Once, Martni Kim M.D.    labetalol (Normodyne/Trandate) injection 10 mg, 10 mg, Intravenous, Once, Jemal Lopez M.D.    enoxaparin (Lovenox) inj 40 mg, 40 mg, Subcutaneous, DAILY AT 1800, Girish Gomez M.D., 40 mg at 07/26/24 0012    acetaminophen (Tylenol) tablet 650 mg, 650 mg, Oral, Q6HRS PRN, Girish Gomez M.D.    hydrALAZINE (Apresoline) injection 10 mg, 10 mg, Intravenous, Q4HRS PRN, Girish Gomez M.D.    ondansetron (Zofran) syringe/vial injection 4 mg, 4 mg, Intravenous, Q4HRS PRN, Girish Gomez M.D.    ondansetron (Zofran ODT) dispertab 4 mg, 4 mg, Oral, Q4HRS PRN, Girish Gomez M.D.    ampicillin/sulbactam (Unasyn) 3 g in  mL IVPB, 3 g, Intravenous, Q6HRS, Girish Gomez M.D., Stopped at 07/26/24 1229    Physical Examination:     General: Patient is awake and in no acute distress  Eye: Complete ptosis on the left  Neck: There is normal range of motion  CV: regular rate   Extremities:  clear, dry, intact, without peripheral edema    NEUROLOGICAL EXAM:     /63   Pulse 76   Temp 36.6 °C (97.8 °F) (Temporal)   Resp 16   Ht 1.702 m (5' 7.01\")   Wt 95 kg (209 lb 7 oz)   SpO2 93%   BMI 32.79 kg/m²     Mental status: Awake, alert and oriented.  Attention is intact.  Answers questions appropriately.  Language: Fluent with intact comprehension.  No dysarthria.  Cranial nerves:   Pupils are round and reactive to light however there is asymmetry characterized by sluggish on the left.  No clear anisocoria.  Intact visual fields  Disconjugate with the left eye laterally deviated and slightly downward.  Ductions are full OD.  Ductions OS with relatively preserved abduction.  Otherwise restricted.   Intact muscles of mastication    Intact facial sensation  Complete ptosis on the left. " "Negative Ice Pack test. Face otherwise appears symmetric   Hearing is intact to conversation   Symmetric shoulder shrug   Symmetric elevation of the palate; uvula appears midline   Tongue protrusion is midline  Motor: Normal bulk and tone. 5/5 strength in bilateral upper and lower extremities (proximal & distal muscles). No pronator drift. No abnormal movements or postures.   Reflexes: Deep tendon reflexes 2 and symmetric in the bilateral upper and lower extremities. Bilateral plantar responses are equivocal.   Sensory: Intact to light-touch and temperature.  Coordination: No dysmetria.  Gait: Not tested.     Objective Data:    Labs:  Lab Results   Component Value Date/Time    PROTHROMBTM 13.5 07/25/2024 05:55 PM    INR 1.02 07/25/2024 05:55 PM      Lab Results   Component Value Date/Time    WBC 17.3 (H) 07/26/2024 03:40 AM    RBC 4.44 (L) 07/26/2024 03:40 AM    HEMOGLOBIN 14.5 07/26/2024 03:40 AM    HEMATOCRIT 42.0 07/26/2024 03:40 AM    MCV 94.6 07/26/2024 03:40 AM    MCH 32.7 07/26/2024 03:40 AM    MCHC 34.5 07/26/2024 03:40 AM    MPV 10.9 07/26/2024 03:40 AM    NEUTSPOLYS 73.00 (H) 07/25/2024 05:55 PM    LYMPHOCYTES 19.70 (L) 07/25/2024 05:55 PM    MONOCYTES 6.00 07/25/2024 05:55 PM    EOSINOPHILS 0.40 07/25/2024 05:55 PM    BASOPHILS 0.60 07/25/2024 05:55 PM      Lab Results   Component Value Date/Time    SODIUM 137 07/26/2024 03:40 AM    POTASSIUM 3.7 07/26/2024 03:40 AM    CHLORIDE 103 07/26/2024 03:40 AM    CO2 21 07/26/2024 03:40 AM    GLUCOSE 100 (H) 07/26/2024 03:40 AM    BUN 14 07/26/2024 03:40 AM    CREATININE 0.72 07/26/2024 03:40 AM      No results found for: \"CHOLSTRLTOT\", \"LDL\", \"HDL\", \"TRIGLYCERIDE\"    Lab Results   Component Value Date/Time    ALKPHOSPHAT 70 07/26/2024 03:40 AM    ASTSGOT 28 07/26/2024 03:40 AM    ALTSGPT 12 07/26/2024 03:40 AM    TBILIRUBIN 0.9 07/26/2024 03:40 AM        Imaging/Testing:    I interpreted and/or reviewed the patient's neuroimaging    [id number]   Final " Result      1.  Thin rim of lucency about the second from last left mandibular molar which may represent an early odontogenic apical abscess.   2.  Multiple missing teeth.      CT-MAXILLOFACIAL W/O PLUS RECONS   Final Result         1.  No enhancing soft tissue fluid collection identified to indicate abscess.   2.  Bony lucency adjacent to the tip of the right maxillary second premolar, likely related to odontogenic apical abscess.   3.  Atherosclerosis      CT-CTV HEAD WITH & W/O POST PROCESS   Final Result      There is no evidence of dural venous sinus thrombosis.      CT-CTA NECK WITH & W/O-POST PROCESSING   Final Result      1.  CT angiogram of the neck within normal limits.   2.  Possible 3 mm right distal internal carotid artery aneurysm versus infundibulum. Finding could be further evaluated with conventional angiography on a nonemergent basis.      Findings were conveyed to Dr. KAREN GARCIA on 7/25/2024 7:51 PM via electronic messaging.      CT-CTA HEAD WITH & W/O-POST PROCESS   Final Result   Addendum (preliminary) 1 of 1   Questionable 3 mm outpouching in the right distal internal carotid artery    could be aneurysm or prominent infundibulum.      Final         1. No hemodynamically significant narrowing of the major intracranial vessels.      MR-BRAIN-W/O    (Results Pending)       Impression and Recommendations: Aguila Botello is a 79 y.o. man  presenting with hypertensive urgency, dental abscess and left eyelid drooping for whom neurology has been consulted for subacute third cranial nerve palsy (OS).     Complete ptosis (OD) with ophthalmoplegia relatively sparing abduction and with pupillary involvement characterized by sluggish reactivity to bright light concerning for an oculomotor palsy.  Interpretation of these clinical findings is somewhat difficult as the patient has had some type of eye surgery involving the left eye during childhood of unclear specific details and has had poor vision  since that time.  Has not experienced double vision.  The symptoms seem to be isolated without other neurologic findings or symptoms.  There were no acute findings identified on CT angiography of the head and neck and CTV.  However a possible 3 mm right distal internal carotid artery aneurysm as mentioned.  Further evaluation with conventional angiography may be warranted.  I have reached out to interventional neuroradiology for their review of these data; their review is pending.  I agree with additional workup to include MRI brain with and without contrast as well as MRI orbits with and without contrast.  Laboratory studies as recommended below.    -MRI brain and MRI orbits with and without contrast.  -Send AChR receptor antibodies.  -Check ESR and CRP.   -Maintain normotension.  -q4hr neuro checks.    Neurology will follow along.         Taran Kyle MD  Neurohospitalist, Acute Care Services      The evaluation of the patient, and recommended management, was discussed with Dr. Kim     I personally provided 65 minutes of total acute neurologic care time outside of time spent on separately billable/documented procedures. Time includes: review of laboratory data, review of radiology studies, discussion with consultants, discussion with family/patient, monitoring for potential decompensation.  Interventions were performed as documented in the chart.        Please note that this dictation was created using voice recognition software.  I have made every reasonable attempt to correct obvious errors, but I expect that there are errors of grammar and possibly content that I did not discover before finalizing the note.

## 2024-07-26 NOTE — ASSESSMENT & PLAN NOTE
Sepsis resolved  Continue IV antibiotic  Follow culture  Likely switch to oral antibiotic tomorrow

## 2024-07-26 NOTE — ED NOTES
Break RN      To floor via gurney with Transport; AOX4 GCS15; on room air; all belongings with patient

## 2024-07-27 ENCOUNTER — APPOINTMENT (OUTPATIENT)
Dept: RADIOLOGY | Facility: MEDICAL CENTER | Age: 79
End: 2024-07-27
Attending: PSYCHIATRY & NEUROLOGY
Payer: MEDICARE

## 2024-07-27 ENCOUNTER — ANESTHESIA (OUTPATIENT)
Dept: SURGERY | Facility: MEDICAL CENTER | Age: 79
End: 2024-07-27
Payer: MEDICARE

## 2024-07-27 ENCOUNTER — ANESTHESIA EVENT (OUTPATIENT)
Dept: SURGERY | Facility: MEDICAL CENTER | Age: 79
End: 2024-07-27
Payer: MEDICARE

## 2024-07-27 LAB
ANION GAP SERPL CALC-SCNC: 12 MMOL/L (ref 7–16)
BASOPHILS # BLD AUTO: 0.9 % (ref 0–1.8)
BASOPHILS # BLD: 0.12 K/UL (ref 0–0.12)
BUN SERPL-MCNC: 16 MG/DL (ref 8–22)
CALCIUM SERPL-MCNC: 8.6 MG/DL (ref 8.5–10.5)
CHLORIDE SERPL-SCNC: 106 MMOL/L (ref 96–112)
CO2 SERPL-SCNC: 20 MMOL/L (ref 20–33)
CREAT SERPL-MCNC: 0.76 MG/DL (ref 0.5–1.4)
EKG IMPRESSION: NORMAL
EOSINOPHIL # BLD AUTO: 0.27 K/UL (ref 0–0.51)
EOSINOPHIL NFR BLD: 2.1 % (ref 0–6.9)
ERYTHROCYTE [DISTWIDTH] IN BLOOD BY AUTOMATED COUNT: 47.3 FL (ref 35.9–50)
GFR SERPLBLD CREATININE-BSD FMLA CKD-EPI: 91 ML/MIN/1.73 M 2
GLUCOSE SERPL-MCNC: 111 MG/DL (ref 65–99)
HCT VFR BLD AUTO: 38.9 % (ref 42–52)
HGB BLD-MCNC: 13.4 G/DL (ref 14–18)
IMM GRANULOCYTES # BLD AUTO: 0.04 K/UL (ref 0–0.11)
IMM GRANULOCYTES NFR BLD AUTO: 0.3 % (ref 0–0.9)
LACTATE SERPL-SCNC: 1 MMOL/L (ref 0.5–2)
LYMPHOCYTES # BLD AUTO: 5.11 K/UL (ref 1–4.8)
LYMPHOCYTES NFR BLD: 40 % (ref 22–41)
MCH RBC QN AUTO: 32.7 PG (ref 27–33)
MCHC RBC AUTO-ENTMCNC: 34.4 G/DL (ref 32.3–36.5)
MCV RBC AUTO: 94.9 FL (ref 81.4–97.8)
MONOCYTES # BLD AUTO: 1.1 K/UL (ref 0–0.85)
MONOCYTES NFR BLD AUTO: 8.6 % (ref 0–13.4)
NEUTROPHILS # BLD AUTO: 6.15 K/UL (ref 1.82–7.42)
NEUTROPHILS NFR BLD: 48.1 % (ref 44–72)
NRBC # BLD AUTO: 0 K/UL
NRBC BLD-RTO: 0 /100 WBC (ref 0–0.2)
PLATELET # BLD AUTO: 264 K/UL (ref 164–446)
PMV BLD AUTO: 10.8 FL (ref 9–12.9)
POTASSIUM SERPL-SCNC: 3.7 MMOL/L (ref 3.6–5.5)
RBC # BLD AUTO: 4.1 M/UL (ref 4.7–6.1)
SODIUM SERPL-SCNC: 138 MMOL/L (ref 135–145)
WBC # BLD AUTO: 12.8 K/UL (ref 4.8–10.8)

## 2024-07-27 PROCEDURE — 80048 BASIC METABOLIC PNL TOTAL CA: CPT

## 2024-07-27 PROCEDURE — 0CDXXZ0 EXTRACTION OF LOWER TOOTH, SINGLE, EXTERNAL APPROACH: ICD-10-PCS | Performed by: DENTIST

## 2024-07-27 PROCEDURE — 700101 HCHG RX REV CODE 250: Performed by: DENTIST

## 2024-07-27 PROCEDURE — 160035 HCHG PACU - 1ST 60 MINS PHASE I: Performed by: DENTIST

## 2024-07-27 PROCEDURE — 85025 COMPLETE CBC W/AUTO DIFF WBC: CPT

## 2024-07-27 PROCEDURE — 0CDWXZ0 EXTRACTION OF UPPER TOOTH, SINGLE, EXTERNAL APPROACH: ICD-10-PCS | Performed by: DENTIST

## 2024-07-27 PROCEDURE — 36415 COLL VENOUS BLD VENIPUNCTURE: CPT

## 2024-07-27 PROCEDURE — 93010 ELECTROCARDIOGRAM REPORT: CPT | Performed by: STUDENT IN AN ORGANIZED HEALTH CARE EDUCATION/TRAINING PROGRAM

## 2024-07-27 PROCEDURE — 700111 HCHG RX REV CODE 636 W/ 250 OVERRIDE (IP): Mod: JZ | Performed by: STUDENT IN AN ORGANIZED HEALTH CARE EDUCATION/TRAINING PROGRAM

## 2024-07-27 PROCEDURE — A9270 NON-COVERED ITEM OR SERVICE: HCPCS | Performed by: STUDENT IN AN ORGANIZED HEALTH CARE EDUCATION/TRAINING PROGRAM

## 2024-07-27 PROCEDURE — 700105 HCHG RX REV CODE 258

## 2024-07-27 PROCEDURE — 700101 HCHG RX REV CODE 250: Performed by: ANESTHESIOLOGY

## 2024-07-27 PROCEDURE — 700117 HCHG RX CONTRAST REV CODE 255: Mod: JZ | Performed by: PSYCHIATRY & NEUROLOGY

## 2024-07-27 PROCEDURE — 160009 HCHG ANES TIME/MIN: Performed by: DENTIST

## 2024-07-27 PROCEDURE — 700111 HCHG RX REV CODE 636 W/ 250 OVERRIDE (IP): Mod: JZ | Performed by: ANESTHESIOLOGY

## 2024-07-27 PROCEDURE — 700102 HCHG RX REV CODE 250 W/ 637 OVERRIDE(OP): Performed by: STUDENT IN AN ORGANIZED HEALTH CARE EDUCATION/TRAINING PROGRAM

## 2024-07-27 PROCEDURE — 770001 HCHG ROOM/CARE - MED/SURG/GYN PRIV*

## 2024-07-27 PROCEDURE — 700102 HCHG RX REV CODE 250 W/ 637 OVERRIDE(OP): Performed by: ANESTHESIOLOGY

## 2024-07-27 PROCEDURE — A9579 GAD-BASE MR CONTRAST NOS,1ML: HCPCS | Mod: JZ | Performed by: PSYCHIATRY & NEUROLOGY

## 2024-07-27 PROCEDURE — 99233 SBSQ HOSP IP/OBS HIGH 50: CPT | Performed by: STUDENT IN AN ORGANIZED HEALTH CARE EDUCATION/TRAINING PROGRAM

## 2024-07-27 PROCEDURE — 70553 MRI BRAIN STEM W/O & W/DYE: CPT

## 2024-07-27 PROCEDURE — 700105 HCHG RX REV CODE 258: Performed by: STUDENT IN AN ORGANIZED HEALTH CARE EDUCATION/TRAINING PROGRAM

## 2024-07-27 PROCEDURE — 160048 HCHG OR STATISTICAL LEVEL 1-5: Performed by: DENTIST

## 2024-07-27 PROCEDURE — 160002 HCHG RECOVERY MINUTES (STAT): Performed by: DENTIST

## 2024-07-27 PROCEDURE — 700105 HCHG RX REV CODE 258: Performed by: ANESTHESIOLOGY

## 2024-07-27 PROCEDURE — A9270 NON-COVERED ITEM OR SERVICE: HCPCS | Performed by: ANESTHESIOLOGY

## 2024-07-27 PROCEDURE — 70543 MRI ORBT/FAC/NCK W/O &W/DYE: CPT

## 2024-07-27 PROCEDURE — 83605 ASSAY OF LACTIC ACID: CPT

## 2024-07-27 PROCEDURE — 160027 HCHG SURGERY MINUTES - 1ST 30 MINS LEVEL 2: Performed by: DENTIST

## 2024-07-27 PROCEDURE — 93005 ELECTROCARDIOGRAM TRACING: CPT | Performed by: DENTIST

## 2024-07-27 PROCEDURE — 160038 HCHG SURGERY MINUTES - EA ADDL 1 MIN LEVEL 2: Performed by: DENTIST

## 2024-07-27 RX ORDER — LIDOCAINE HYDROCHLORIDE 20 MG/ML
INJECTION, SOLUTION EPIDURAL; INFILTRATION; INTRACAUDAL; PERINEURAL PRN
Status: DISCONTINUED | OUTPATIENT
Start: 2024-07-27 | End: 2024-07-27 | Stop reason: SURG

## 2024-07-27 RX ORDER — LIDOCAINE HYDROCHLORIDE 40 MG/ML
SOLUTION TOPICAL PRN
Status: DISCONTINUED | OUTPATIENT
Start: 2024-07-27 | End: 2024-07-27 | Stop reason: SURG

## 2024-07-27 RX ORDER — OXYCODONE HCL 5 MG/5 ML
5 SOLUTION, ORAL ORAL
Status: COMPLETED | OUTPATIENT
Start: 2024-07-27 | End: 2024-07-27

## 2024-07-27 RX ORDER — LIDOCAINE HYDROCHLORIDE AND EPINEPHRINE 10; 10 MG/ML; UG/ML
INJECTION, SOLUTION INFILTRATION; PERINEURAL
Status: DISCONTINUED | OUTPATIENT
Start: 2024-07-27 | End: 2024-07-27 | Stop reason: HOSPADM

## 2024-07-27 RX ORDER — MEPERIDINE HYDROCHLORIDE 25 MG/ML
12.5 INJECTION INTRAMUSCULAR; INTRAVENOUS; SUBCUTANEOUS
Status: DISCONTINUED | OUTPATIENT
Start: 2024-07-27 | End: 2024-07-27 | Stop reason: HOSPADM

## 2024-07-27 RX ORDER — KETOROLAC TROMETHAMINE 15 MG/ML
INJECTION, SOLUTION INTRAMUSCULAR; INTRAVENOUS PRN
Status: DISCONTINUED | OUTPATIENT
Start: 2024-07-27 | End: 2024-07-27 | Stop reason: SURG

## 2024-07-27 RX ORDER — OXYCODONE HCL 5 MG/5 ML
10 SOLUTION, ORAL ORAL
Status: COMPLETED | OUTPATIENT
Start: 2024-07-27 | End: 2024-07-27

## 2024-07-27 RX ORDER — HYDROMORPHONE HYDROCHLORIDE 1 MG/ML
0.2 INJECTION, SOLUTION INTRAMUSCULAR; INTRAVENOUS; SUBCUTANEOUS
Status: DISCONTINUED | OUTPATIENT
Start: 2024-07-27 | End: 2024-07-27 | Stop reason: HOSPADM

## 2024-07-27 RX ORDER — DEXAMETHASONE SODIUM PHOSPHATE 4 MG/ML
INJECTION, SOLUTION INTRA-ARTICULAR; INTRALESIONAL; INTRAMUSCULAR; INTRAVENOUS; SOFT TISSUE PRN
Status: DISCONTINUED | OUTPATIENT
Start: 2024-07-27 | End: 2024-07-27 | Stop reason: SURG

## 2024-07-27 RX ORDER — LISINOPRIL 5 MG/1
5 TABLET ORAL
Status: DISCONTINUED | OUTPATIENT
Start: 2024-07-27 | End: 2024-07-29 | Stop reason: HOSPADM

## 2024-07-27 RX ORDER — HALOPERIDOL 5 MG/ML
1 INJECTION INTRAMUSCULAR
Status: DISCONTINUED | OUTPATIENT
Start: 2024-07-27 | End: 2024-07-27 | Stop reason: HOSPADM

## 2024-07-27 RX ORDER — ROCURONIUM BROMIDE 10 MG/ML
INJECTION, SOLUTION INTRAVENOUS PRN
Status: DISCONTINUED | OUTPATIENT
Start: 2024-07-27 | End: 2024-07-27 | Stop reason: SURG

## 2024-07-27 RX ORDER — ONDANSETRON 2 MG/ML
4 INJECTION INTRAMUSCULAR; INTRAVENOUS
Status: DISCONTINUED | OUTPATIENT
Start: 2024-07-27 | End: 2024-07-27 | Stop reason: HOSPADM

## 2024-07-27 RX ORDER — SODIUM CHLORIDE, SODIUM GLUCONATE, SODIUM ACETATE, POTASSIUM CHLORIDE AND MAGNESIUM CHLORIDE 526; 502; 368; 37; 30 MG/100ML; MG/100ML; MG/100ML; MG/100ML; MG/100ML
500 INJECTION, SOLUTION INTRAVENOUS CONTINUOUS
Status: DISCONTINUED | OUTPATIENT
Start: 2024-07-27 | End: 2024-07-27 | Stop reason: HOSPADM

## 2024-07-27 RX ORDER — ONDANSETRON 2 MG/ML
INJECTION INTRAMUSCULAR; INTRAVENOUS PRN
Status: DISCONTINUED | OUTPATIENT
Start: 2024-07-27 | End: 2024-07-27 | Stop reason: SURG

## 2024-07-27 RX ORDER — SODIUM CHLORIDE, SODIUM LACTATE, POTASSIUM CHLORIDE, CALCIUM CHLORIDE 600; 310; 30; 20 MG/100ML; MG/100ML; MG/100ML; MG/100ML
INJECTION, SOLUTION INTRAVENOUS
Status: DISCONTINUED | OUTPATIENT
Start: 2024-07-27 | End: 2024-07-27 | Stop reason: SURG

## 2024-07-27 RX ORDER — IPRATROPIUM BROMIDE AND ALBUTEROL SULFATE 2.5; .5 MG/3ML; MG/3ML
3 SOLUTION RESPIRATORY (INHALATION)
Status: DISCONTINUED | OUTPATIENT
Start: 2024-07-27 | End: 2024-07-27 | Stop reason: HOSPADM

## 2024-07-27 RX ORDER — HYDROMORPHONE HYDROCHLORIDE 1 MG/ML
1 INJECTION, SOLUTION INTRAMUSCULAR; INTRAVENOUS; SUBCUTANEOUS
Status: DISCONTINUED | OUTPATIENT
Start: 2024-07-27 | End: 2024-07-27 | Stop reason: HOSPADM

## 2024-07-27 RX ORDER — CEFAZOLIN SODIUM 1 G/3ML
INJECTION, POWDER, FOR SOLUTION INTRAMUSCULAR; INTRAVENOUS PRN
Status: DISCONTINUED | OUTPATIENT
Start: 2024-07-27 | End: 2024-07-27 | Stop reason: SURG

## 2024-07-27 RX ORDER — MIDAZOLAM HYDROCHLORIDE 1 MG/ML
1 INJECTION INTRAMUSCULAR; INTRAVENOUS
Status: DISCONTINUED | OUTPATIENT
Start: 2024-07-27 | End: 2024-07-27 | Stop reason: HOSPADM

## 2024-07-27 RX ORDER — HYDROMORPHONE HYDROCHLORIDE 1 MG/ML
0.4 INJECTION, SOLUTION INTRAMUSCULAR; INTRAVENOUS; SUBCUTANEOUS
Status: DISCONTINUED | OUTPATIENT
Start: 2024-07-27 | End: 2024-07-27 | Stop reason: HOSPADM

## 2024-07-27 RX ORDER — DIPHENHYDRAMINE HYDROCHLORIDE 50 MG/ML
12.5 INJECTION INTRAMUSCULAR; INTRAVENOUS
Status: DISCONTINUED | OUTPATIENT
Start: 2024-07-27 | End: 2024-07-27 | Stop reason: HOSPADM

## 2024-07-27 RX ADMIN — LIDOCAINE HYDROCHLORIDE 50 MG: 20 INJECTION, SOLUTION EPIDURAL; INFILTRATION; INTRACAUDAL at 12:49

## 2024-07-27 RX ADMIN — PROPOFOL 140 MG: 10 INJECTION, EMULSION INTRAVENOUS at 12:49

## 2024-07-27 RX ADMIN — KETOROLAC TROMETHAMINE 15 MG: 15 INJECTION, SOLUTION INTRAMUSCULAR; INTRAVENOUS at 12:57

## 2024-07-27 RX ADMIN — AMLODIPINE BESYLATE 5 MG: 5 TABLET ORAL at 04:32

## 2024-07-27 RX ADMIN — GADOTERIDOL 18 ML: 279.3 INJECTION, SOLUTION INTRAVENOUS at 18:49

## 2024-07-27 RX ADMIN — AMPICILLIN AND SULBACTAM 3 G: 1; 2 INJECTION, POWDER, FOR SOLUTION INTRAMUSCULAR; INTRAVENOUS at 18:41

## 2024-07-27 RX ADMIN — SODIUM CHLORIDE, POTASSIUM CHLORIDE, SODIUM LACTATE AND CALCIUM CHLORIDE 500 ML: 600; 310; 30; 20 INJECTION, SOLUTION INTRAVENOUS at 00:34

## 2024-07-27 RX ADMIN — LISINOPRIL 5 MG: 5 TABLET ORAL at 18:38

## 2024-07-27 RX ADMIN — LIDOCAINE HYDROCHLORIDE 4 ML: 40 SOLUTION TOPICAL at 12:49

## 2024-07-27 RX ADMIN — SUGAMMADEX 400 MG: 100 INJECTION, SOLUTION INTRAVENOUS at 12:57

## 2024-07-27 RX ADMIN — DEXAMETHASONE SODIUM PHOSPHATE 8 MG: 4 INJECTION INTRA-ARTICULAR; INTRALESIONAL; INTRAMUSCULAR; INTRAVENOUS; SOFT TISSUE at 12:53

## 2024-07-27 RX ADMIN — SODIUM CHLORIDE, POTASSIUM CHLORIDE, SODIUM LACTATE AND CALCIUM CHLORIDE: 600; 310; 30; 20 INJECTION, SOLUTION INTRAVENOUS at 12:31

## 2024-07-27 RX ADMIN — OXYCODONE HYDROCHLORIDE 10 MG: 5 SOLUTION ORAL at 13:31

## 2024-07-27 RX ADMIN — ONDANSETRON 4 MG: 2 INJECTION INTRAMUSCULAR; INTRAVENOUS at 12:57

## 2024-07-27 RX ADMIN — ROCURONIUM BROMIDE 80 MG: 50 INJECTION, SOLUTION INTRAVENOUS at 12:49

## 2024-07-27 RX ADMIN — CEFAZOLIN 2 G: 1 INJECTION, POWDER, FOR SOLUTION INTRAMUSCULAR; INTRAVENOUS at 12:49

## 2024-07-27 RX ADMIN — AMPICILLIN AND SULBACTAM 3 G: 1; 2 INJECTION, POWDER, FOR SOLUTION INTRAMUSCULAR; INTRAVENOUS at 04:35

## 2024-07-27 RX ADMIN — ENOXAPARIN SODIUM 40 MG: 100 INJECTION SUBCUTANEOUS at 18:38

## 2024-07-27 RX ADMIN — HYDRALAZINE HYDROCHLORIDE 10 MG: 20 INJECTION, SOLUTION INTRAMUSCULAR; INTRAVENOUS at 08:21

## 2024-07-27 ASSESSMENT — PAIN DESCRIPTION - PAIN TYPE
TYPE: ACUTE PAIN
TYPE: ACUTE PAIN;SURGICAL PAIN

## 2024-07-27 ASSESSMENT — ENCOUNTER SYMPTOMS
SPEECH CHANGE: 0
FOCAL WEAKNESS: 0
WEAKNESS: 0
SENSORY CHANGE: 0

## 2024-07-27 ASSESSMENT — PAIN SCALES - GENERAL: PAIN_LEVEL: 4

## 2024-07-27 NOTE — CARE PLAN
The patient is Stable - Low risk of patient condition declining or worsening    Shift Goals  Clinical Goals: Monitor neuro/BP  Patient Goals: Rest  Family Goals: updated on poc    Progress made toward(s) clinical / shift goals:  Progressing    Patient is not progressing towards the following goals:

## 2024-07-27 NOTE — CARE PLAN
The patient is Stable - Low risk of patient condition declining or worsening    Shift Goals  Clinical Goals: Safety, monitor neuro  Patient Goals: Rest  Family Goals: SHERRIE    Progress made toward(s) clinical / shift goals:  Progressing    Patient is not progressing towards the following goals:

## 2024-07-27 NOTE — OP REPORT
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 male with carious/non-restorable teeth #4, 19 as confirmed by clinical and radiographic examination. Reviewed details of the procedure, benefits and risks including but not limited to: pain, bleeding, swelling, infection, scarring, risk of injury to nerves of the face (movement and feeling), and need for additional surgeries. All questions encouraged and answered and consent signed.    Description of procedure:  The patient was transported to the operating room and identified. Induction of anesthesia and intubation completed without complication. Patient was positioned and padded. A timeout was performed and appropriate medications given.    The patient was prepped and draped in a sterile fashion.     Injected 10cc of 1% Lidocaine w/ 1:100k epi to surgical sites. Directed attention to teeth #4, 19. Teeth were extracted in standard surgical fashion. Curettage and copious irrigation of extraction sockets. No sutures necessary.     At this point the procedure was complete. The patient was turned over to the anesthesia team and extubated in the OR without complication. The patient was then transferred to the PACU for recovery.    Estimated blood loss: see anesthesia record    Fluids/Products: see anesthesia record    Dressings: none    Drains: none    Complications: none    Ross Woodard DMD  [facility]

## 2024-07-27 NOTE — OR NURSING
Pt on 3 L NC.  No c/o nausea, tolerating PO fluids and liquid medication. Oral surgical pain/discomfort tolerable per pt, 4/10. No oral secretions or bleeding.  VSS, afebrile, RODRIGUEZ, A/O x4.     Left hand ring in place, pants in place.   Oxygen tank 100% full.

## 2024-07-27 NOTE — CONSULTS
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 No abx needed from OMFS standpoint  2. Okay for soft diet and to advance as tolerated  3. Bite on gauze PRN for oral oozing   4. Recommend Norco PRN pain     Procedure went well - no further surgical intervention needed.    Thank you,  Ross Woodard, DMD  [phone]

## 2024-07-27 NOTE — PROGRESS NOTES
Hospital Medicine Daily Progress Note    Date of Service  7/27/2024    Chief Complaint  Aguila Botello is a 79 y.o. male admitted 7/25/2024 with tunnel palsy    Hospital Course  79 male with past medical history of hypertension, dental caries referred by his dentist for hypertension and left eye ptosis.  Patient was seen by his dentist for dental extraction and noted to have significant elevated blood pressure and ptosis for which he was referred to ED for further evaluation.  On arrival to ED noted to have odontogenic apical abscess on CT maxillofacial.  CT head CTA neck negative for acute stroke/hemorrhage.  Neurology Dr. Kyle has consulted for evaluation.    S/p dental extraction for ongoing periapical abscess    Interval Problem Update  7/27/2024  Patient seen and examined at bedside  Vitals remained stable  Denies any discomfort.  No change in neuroexam  Labs reviewed noted leukocytosis was improving white count 12.8, BMP unremarkable, lactic acidosis resolved, blood culture remain negative.  S/p dental extraction      Telemetry monitor  Continue on IV antibiotics, follow blood culture  MRI brain, MRI orbits pending  Neurochecks every 4 hours  Repeat BMP in a.m. to monitor electrolytes   Repeat CBC in a.m. to monitor white count and hemoglobin  Need close monitoring of blood counts, electrolytes and renal function due to potential of organ dysfunction due to ongoing sepsis.  Need to monitor closely for toxicity while on IV antibiotics  Requiring IV antibiotics, need close monitoring for toxicity  Care discussed with oral surgery   Case discussed with neurology Dr. Kyle    High risk of deterioration into severe sepsis.  Need close monitoring          I have discussed this patient's plan of care and discharge plan at IDT rounds today with Case Management, Nursing, Nursing leadership, and other members of the IDT team.    Consultants/Specialty  infectious disease and neurology    Code Status  Full  Code    Disposition  The patient is not medically cleared for discharge to home or a post-acute facility.  Anticipate discharge to: home with close outpatient follow-up    I have placed the appropriate orders for post-discharge needs.    Review of Systems  Review of Systems   Eyes:  Blurred vision: ptosis.   Musculoskeletal:  Positive for joint pain.   Neurological:  Negative for sensory change, speech change, focal weakness and weakness.        Physical Exam  Temp:  [36.1 °C (97 °F)-36.9 °C (98.5 °F)] 36.7 °C (98 °F)  Pulse:  [] 91  Resp:  [12-20] 16  BP: (134-190)/(64-93) 149/68  SpO2:  [93 %-99 %] 98 %    Physical Exam  Constitutional:       Appearance: Normal appearance.   HENT:      Head:      Comments: Dental caries  Cardiovascular:      Rate and Rhythm: Normal rate and regular rhythm.      Pulses: Normal pulses.      Heart sounds: Normal heart sounds.   Pulmonary:      Effort: Pulmonary effort is normal.   Abdominal:      General: Abdomen is flat.   Neurological:      Mental Status: He is alert and oriented to person, place, and time.      Cranial Nerves: Cranial nerve deficit present.      Comments: Left eye ptosis, disconjugate gaze, normal sized pupils, reactive to light.         Fluids    Intake/Output Summary (Last 24 hours) at 7/27/2024 1658  Last data filed at 7/27/2024 1450  Gross per 24 hour   Intake 600 ml   Output 1285 ml   Net -685 ml       Laboratory  Recent Labs     07/25/24  1755 07/26/24  0340 07/27/24  0358   WBC 14.5* 17.3* 12.8*   RBC 4.82 4.44* 4.10*   HEMOGLOBIN 15.7 14.5 13.4*   HEMATOCRIT 46.6 42.0 38.9*   MCV 96.7 94.6 94.9   MCH 32.6 32.7 32.7   MCHC 33.7 34.5 34.4   RDW 47.2 46.2 47.3   PLATELETCT 312 291 264   MPV 10.8 10.9 10.8     Recent Labs     07/25/24  1755 07/26/24  0340 07/27/24  0358   SODIUM 139 137 138   POTASSIUM 4.3 3.7 3.7   CHLORIDE 102 103 106   CO2 22 21 20   GLUCOSE 118* 100* 111*   BUN 13 14 16   CREATININE 0.98 0.72 0.76   CALCIUM 9.3 8.6 8.6     Recent  Labs     07/25/24  1755 07/26/24  1537   APTT 25.1  --    INR 1.02 1.07               Imaging  [id number]   Final Result      1.  Thin rim of lucency about the second from last left mandibular molar which may represent an early odontogenic apical abscess.   2.  Multiple missing teeth.      CT-MAXILLOFACIAL W/O PLUS RECONS   Final Result         1.  No enhancing soft tissue fluid collection identified to indicate abscess.   2.  Bony lucency adjacent to the tip of the right maxillary second premolar, likely related to odontogenic apical abscess.   3.  Atherosclerosis      CT-CTV HEAD WITH & W/O POST PROCESS   Final Result      There is no evidence of dural venous sinus thrombosis.      CT-CTA NECK WITH & W/O-POST PROCESSING   Final Result      1.  CT angiogram of the neck within normal limits.   2.  Possible 3 mm right distal internal carotid artery aneurysm versus infundibulum. Finding could be further evaluated with conventional angiography on a nonemergent basis.      Findings were conveyed to Dr. KAREN GARCIA on 7/25/2024 7:51 PM via electronic messaging.      CT-CTA HEAD WITH & W/O-POST PROCESS   Final Result   Addendum (preliminary) 1 of 1   Questionable 3 mm outpouching in the right distal internal carotid artery    could be aneurysm or prominent infundibulum.      Final         1. No hemodynamically significant narrowing of the major intracranial vessels.      MR-BRAIN-WITH & W/O    (Results Pending)   MR-ORBITS,FACE,NECK-WITH&W/O & SEQUENCES    (Results Pending)        Assessment/Plan  * 3rd cranial nerve palsy  Assessment & Plan  Patient has a 1 week history of cranial nerve III palsy and ptosis   CTA head and neck and CTV unremarkable  MRI brain without contrast and MRI orbit pending  Neurology following    Sepsis with organ dysfunction (HCC)  Assessment & Plan  This is Sepsis Not present on admission  SIRS criteria identified on my evaluation include: Tachycardia, with heart rate greater  than 90 BPM and Leukocytosis, with WBC greater than 12,000  Clinical indicators of end organ dysfunction include Lactic Acid greater than 2  Source is dental abscess  Sepsis protocol initiated  Crystalloid Fluid Administration: Fluid resuscitation ordered per standard protocol - 30 mL/kg per current or ideal body weight  IV antibiotics as appropriate for source of sepsis  Reassessment: I have reassessed the patient's hemodynamic status    Dental abscess  Assessment & Plan  S/p dental extraction for ongoing periapical abscess  Follow blood culture    Hypertensive urgency  Assessment & Plan  Optimize blood pressure on Norvasc  Started on lisinopril  On IV  hydralazine as needed    Advanced care planning/counseling discussion  Assessment & Plan  Full code per admitting provider           VTE prophylaxis: lovenox    I have performed a physical exam and reviewed and updated ROS and Plan today (7/27/2024). In review of yesterday's note (7/26/2024), there are no changes except as documented above.

## 2024-07-27 NOTE — CARE PLAN
The patient is Stable - Low risk of patient condition declining or worsening    Shift Goals  Clinical Goals: Stable neuro status, safety  Patient Goals: Rest  Family Goals: SHERRIE    Progress made toward(s) clinical / shift goals:    Problem: Neuro Status  Goal: Neuro status will remain stable or improve  Outcome: Progressing    Q4H neuro checks in place. Pt's neurological status (A/Ox4) remains unchanged throughout shift. Bed alarm is on, call light within reach.     Problem: Fall Risk  Goal: Patient will remain free from falls  Outcome: Progressing    Bed alarm in place. Pt calls for assistance and is provided appropriate assistive devices when needed. Treaded socks used when ambulating.      Patient is not progressing towards the following goals:

## 2024-07-28 LAB
ANION GAP SERPL CALC-SCNC: 12 MMOL/L (ref 7–16)
BASOPHILS # BLD AUTO: 0.1 % (ref 0–1.8)
BASOPHILS # BLD: 0.02 K/UL (ref 0–0.12)
BUN SERPL-MCNC: 18 MG/DL (ref 8–22)
BURR CELLS/RBC NFR CSF MANUAL: 0 %
C GATTII+NEOFOR DNA CSF QL NAA+NON-PROBE: NOT DETECTED
CALCIUM SERPL-MCNC: 8.5 MG/DL (ref 8.5–10.5)
CHLORIDE SERPL-SCNC: 103 MMOL/L (ref 96–112)
CLARITY CSF: CLEAR
CMV DNA CSF QL NAA+NON-PROBE: NOT DETECTED
CO2 SERPL-SCNC: 23 MMOL/L (ref 20–33)
COLOR CSF: COLORLESS
COLOR SPUN CSF: COLORLESS
CREAT SERPL-MCNC: 0.97 MG/DL (ref 0.5–1.4)
CSF COMMENTS 1658: NORMAL
E COLI K1 DNA CSF QL NAA+NON-PROBE: NOT DETECTED
EOSINOPHIL # BLD AUTO: 0 K/UL (ref 0–0.51)
EOSINOPHIL NFR BLD: 0 % (ref 0–6.9)
ERYTHROCYTE [DISTWIDTH] IN BLOOD BY AUTOMATED COUNT: 48.7 FL (ref 35.9–50)
EV RNA CSF QL NAA+NON-PROBE: NOT DETECTED
GFR SERPLBLD CREATININE-BSD FMLA CKD-EPI: 79 ML/MIN/1.73 M 2
GLUCOSE CSF-MCNC: 92 MG/DL (ref 40–80)
GLUCOSE SERPL-MCNC: 146 MG/DL (ref 65–99)
GP B STREP DNA CSF QL NAA+NON-PROBE: NOT DETECTED
HAEM INFLU DNA CSF QL NAA+NON-PROBE: NOT DETECTED
HCT VFR BLD AUTO: 40.1 % (ref 42–52)
HGB BLD-MCNC: 13.4 G/DL (ref 14–18)
HHV6 DNA CSF QL NAA+NON-PROBE: NOT DETECTED
HSV1 DNA CSF QL NAA+NON-PROBE: NOT DETECTED
HSV2 DNA CSF QL NAA+NON-PROBE: NOT DETECTED
IMM GRANULOCYTES # BLD AUTO: 0.08 K/UL (ref 0–0.11)
IMM GRANULOCYTES NFR BLD AUTO: 0.5 % (ref 0–0.9)
L MONOCYTOG DNA CSF QL NAA+NON-PROBE: NOT DETECTED
LYMPHOCYTES # BLD AUTO: 1.88 K/UL (ref 1–4.8)
LYMPHOCYTES NFR BLD: 12.2 % (ref 22–41)
MAGNESIUM SERPL-MCNC: 2.2 MG/DL (ref 1.5–2.5)
MCH RBC QN AUTO: 32.5 PG (ref 27–33)
MCHC RBC AUTO-ENTMCNC: 33.4 G/DL (ref 32.3–36.5)
MCV RBC AUTO: 97.3 FL (ref 81.4–97.8)
MONOCYTES # BLD AUTO: 0.51 K/UL (ref 0–0.85)
MONOCYTES NFR BLD AUTO: 3.3 % (ref 0–13.4)
N MEN DNA CSF QL NAA+NON-PROBE: NOT DETECTED
NEUTROPHILS # BLD AUTO: 12.9 K/UL (ref 1.82–7.42)
NEUTROPHILS NFR BLD: 83.9 % (ref 44–72)
NRBC # BLD AUTO: 0 K/UL
NRBC BLD-RTO: 0 /100 WBC (ref 0–0.2)
NUC CELL # CSF: 2 CELLS/UL (ref 0–10)
PARECHOVIRUS A RNA CSF QL NAA+NON-PROBE: NOT DETECTED
PHOSPHATE SERPL-MCNC: 3 MG/DL (ref 2.5–4.5)
PLATELET # BLD AUTO: 275 K/UL (ref 164–446)
PMV BLD AUTO: 11.1 FL (ref 9–12.9)
POTASSIUM SERPL-SCNC: 4.3 MMOL/L (ref 3.6–5.5)
PROCALCITONIN SERPL-MCNC: 0.05 NG/ML
PROT CSF-MCNC: 47 MG/DL (ref 15–45)
RBC # BLD AUTO: 4.12 M/UL (ref 4.7–6.1)
RBC # CSF: 4 CELLS/UL
S PNEUM DNA CSF QL NAA+NON-PROBE: NOT DETECTED
SODIUM SERPL-SCNC: 138 MMOL/L (ref 135–145)
SPECIMEN VOL CSF: 15 ML
TUBE # CSF: 4
TUBE # CSF: NORMAL
VZV DNA CSF QL NAA+NON-PROBE: NOT DETECTED
WBC # BLD AUTO: 15.4 K/UL (ref 4.8–10.8)

## 2024-07-28 PROCEDURE — 86255 FLUORESCENT ANTIBODY SCREEN: CPT | Mod: 91

## 2024-07-28 PROCEDURE — 84100 ASSAY OF PHOSPHORUS: CPT

## 2024-07-28 PROCEDURE — 009U3ZX DRAINAGE OF SPINAL CANAL, PERCUTANEOUS APPROACH, DIAGNOSTIC: ICD-10-PCS | Performed by: HOSPITALIST

## 2024-07-28 PROCEDURE — 99232 SBSQ HOSP IP/OBS MODERATE 35: CPT | Performed by: PSYCHIATRY & NEUROLOGY

## 2024-07-28 PROCEDURE — 85025 COMPLETE CBC W/AUTO DIFF WBC: CPT

## 2024-07-28 PROCEDURE — 84157 ASSAY OF PROTEIN OTHER: CPT

## 2024-07-28 PROCEDURE — 62270 DX LMBR SPI PNXR: CPT | Mod: GC | Performed by: HOSPITALIST

## 2024-07-28 PROCEDURE — 84145 PROCALCITONIN (PCT): CPT

## 2024-07-28 PROCEDURE — 87116 MYCOBACTERIA CULTURE: CPT

## 2024-07-28 PROCEDURE — 770006 HCHG ROOM/CARE - MED/SURG/GYN SEMI*

## 2024-07-28 PROCEDURE — 86780 TREPONEMA PALLIDUM: CPT

## 2024-07-28 PROCEDURE — 700105 HCHG RX REV CODE 258: Performed by: STUDENT IN AN ORGANIZED HEALTH CARE EDUCATION/TRAINING PROGRAM

## 2024-07-28 PROCEDURE — 82945 GLUCOSE OTHER FLUID: CPT

## 2024-07-28 PROCEDURE — 86341 ISLET CELL ANTIBODY: CPT

## 2024-07-28 PROCEDURE — 700111 HCHG RX REV CODE 636 W/ 250 OVERRIDE (IP): Mod: JZ | Performed by: STUDENT IN AN ORGANIZED HEALTH CARE EDUCATION/TRAINING PROGRAM

## 2024-07-28 PROCEDURE — 97162 PT EVAL MOD COMPLEX 30 MIN: CPT

## 2024-07-28 PROCEDURE — 80048 BASIC METABOLIC PNL TOTAL CA: CPT

## 2024-07-28 PROCEDURE — 83735 ASSAY OF MAGNESIUM: CPT

## 2024-07-28 PROCEDURE — 87206 SMEAR FLUORESCENT/ACID STAI: CPT

## 2024-07-28 PROCEDURE — 89051 BODY FLUID CELL COUNT: CPT

## 2024-07-28 PROCEDURE — 87483 CNS DNA AMP PROBE TYPE 12-25: CPT

## 2024-07-28 PROCEDURE — 88108 CYTOPATH CONCENTRATE TECH: CPT

## 2024-07-28 PROCEDURE — A9270 NON-COVERED ITEM OR SERVICE: HCPCS | Performed by: STUDENT IN AN ORGANIZED HEALTH CARE EDUCATION/TRAINING PROGRAM

## 2024-07-28 PROCEDURE — 700102 HCHG RX REV CODE 250 W/ 637 OVERRIDE(OP): Performed by: STUDENT IN AN ORGANIZED HEALTH CARE EDUCATION/TRAINING PROGRAM

## 2024-07-28 PROCEDURE — 97166 OT EVAL MOD COMPLEX 45 MIN: CPT

## 2024-07-28 PROCEDURE — 99233 SBSQ HOSP IP/OBS HIGH 50: CPT | Performed by: STUDENT IN AN ORGANIZED HEALTH CARE EDUCATION/TRAINING PROGRAM

## 2024-07-28 RX ORDER — POLYETHYLENE GLYCOL 3350 17 G/17G
1 POWDER, FOR SOLUTION ORAL
Status: DISCONTINUED | OUTPATIENT
Start: 2024-07-28 | End: 2024-07-29 | Stop reason: HOSPADM

## 2024-07-28 RX ORDER — AMOXICILLIN 250 MG
2 CAPSULE ORAL EVERY EVENING
Status: DISCONTINUED | OUTPATIENT
Start: 2024-07-28 | End: 2024-07-29 | Stop reason: HOSPADM

## 2024-07-28 RX ADMIN — AMLODIPINE BESYLATE 5 MG: 5 TABLET ORAL at 04:33

## 2024-07-28 RX ADMIN — AMPICILLIN AND SULBACTAM 3 G: 1; 2 INJECTION, POWDER, FOR SOLUTION INTRAMUSCULAR; INTRAVENOUS at 17:54

## 2024-07-28 RX ADMIN — MAGNESIUM HYDROXIDE 30 ML: 1200 LIQUID ORAL at 13:25

## 2024-07-28 RX ADMIN — AMPICILLIN AND SULBACTAM 3 G: 1; 2 INJECTION, POWDER, FOR SOLUTION INTRAMUSCULAR; INTRAVENOUS at 00:13

## 2024-07-28 RX ADMIN — SENNOSIDES AND DOCUSATE SODIUM 2 TABLET: 50; 8.6 TABLET ORAL at 17:51

## 2024-07-28 RX ADMIN — AMPICILLIN AND SULBACTAM 3 G: 1; 2 INJECTION, POWDER, FOR SOLUTION INTRAMUSCULAR; INTRAVENOUS at 13:27

## 2024-07-28 RX ADMIN — AMPICILLIN AND SULBACTAM 3 G: 1; 2 INJECTION, POWDER, FOR SOLUTION INTRAMUSCULAR; INTRAVENOUS at 04:36

## 2024-07-28 RX ADMIN — LISINOPRIL 5 MG: 5 TABLET ORAL at 04:33

## 2024-07-28 ASSESSMENT — ENCOUNTER SYMPTOMS
WEAKNESS: 0
SENSORY CHANGE: 0
SPEECH CHANGE: 0
FOCAL WEAKNESS: 0

## 2024-07-28 ASSESSMENT — GAIT ASSESSMENTS
GAIT LEVEL OF ASSIST: CONTACT GUARD ASSIST
DISTANCE (FEET): 100
DEVIATION: DECREASED BASE OF SUPPORT;BRADYKINETIC;SHUFFLED GAIT;OTHER (COMMENT)

## 2024-07-28 ASSESSMENT — COGNITIVE AND FUNCTIONAL STATUS - GENERAL
STANDING UP FROM CHAIR USING ARMS: A LITTLE
SUGGESTED CMS G CODE MODIFIER MOBILITY: CJ
WALKING IN HOSPITAL ROOM: A LITTLE
CLIMB 3 TO 5 STEPS WITH RAILING: A LITTLE
MOVING TO AND FROM BED TO CHAIR: A LITTLE
SUGGESTED CMS G CODE MODIFIER DAILY ACTIVITY: CH
MOBILITY SCORE: 20
DAILY ACTIVITIY SCORE: 24

## 2024-07-28 ASSESSMENT — ACTIVITIES OF DAILY LIVING (ADL): TOILETING: INDEPENDENT

## 2024-07-28 ASSESSMENT — PAIN DESCRIPTION - PAIN TYPE: TYPE: ACUTE PAIN

## 2024-07-28 NOTE — THERAPY
Physical Therapy   Initial Evaluation     Patient Name: Aguila Botello  Age:  79 y.o., Sex:  male  Medical Record #: [id number]  Today's Date: 7/28/2024     Precautions  Precautions: Fall Risk    Assessment  Patient is 79 y.o. male admitted on 7/25/224 referred by his dentist for hypertension and left eye ptosis.     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 Patient in bed, agreeable for the session. Able to demonstrate functional mobility tasks as detailed below. Will continue to benefit from PT services to help improve overall functional mobility, facilitate return to prior level of function and ensure safe DC home. Recommend  PT at this time.     Plan    Physical Therapy Initial Treatment Plan   Treatment Plan : Equipment, Family / Caregiver Training, Gait Training, Neuro Re-Education / Balance, Stair Training, Therapeutic Activities  Treatment Frequency: 3 Times per Week  Duration: Until Therapy Goals Met    DC Equipment Recommendations: Single Point Cane  Discharge Recommendations: Recommend home health for continued physical therapy services     Objective     07/28/24 1211   Time In/Time Out   Therapy Start Time 1152   Therapy End Time 1211   Total Therapy Time 19   Initial Contact Note    Initial Contact Note Order Received and Verified, Physical Therapy Evaluation in Progress with Full Report to Follow.   Precautions   Precautions Fall Risk   Vitals   O2 Delivery Device None - Room Air   Pain   Pain Scales 0 to 10 Scale    Intervention Medication (see MAR);Rest   Pain 0 - 10 Group   Location Hip   Location Orientation Right   Therapist Pain Assessment During Activity  (Chronic pain; H/O R hip Sx-resulting in shortened RLE)   Prior Living Situation   Prior Services Intermittent Physical Support for ADL Per Family   Housing / Facility 1 Story House   Steps Into Home 3   Steps In Home 0   Rail Both Rail  (Steps into Home)   Equipment Owned Single Point Cane  (Has not started using it yet)   Lives with - Patient's Self Care Capacity Adult Children  (Son x 2)   Comments Patient mentioned that he lives with 2 sons-one of which works from home, other one works full time outside the home. His sons are always with him when he leaves the house.   Prior Level of Functional Mobility   Bed Mobility Independent   Transfer Status Independent   Ambulation Independent   Ambulation Distance Limited community   Assistive Devices Used None   Stairs Independent   History of Falls   History of Falls No   Cognition    Level of Consciousness Alert   Passive ROM Lower Body   Passive ROM Lower Body X   Comments R hip flexion limited from previous R hip Sx   Active ROM Lower Body    Active ROM Lower Body  X   Comments R hip flexion-seated EOB-barely able to elevate off the bed   Strength Lower Body   Lower Body Strength  X   Comments Grossly LLE WFL; R hip flexion 2-/5 (chronic weakness), R knee flexion/ extension 4/5, R ankle DF/PF-4/5   Sensation Lower Body   Lower Extremity Sensation   WDL   Comments Denies any numbness/ tingling in LE   Lower Body Muscle Tone   Lower Body Muscle Tone  WDL   Coordination Lower Body    Coordination Lower Body  X   Comments RLE-unable to perform heel to shin-due to inadequate strength and ROM; LLE-WFL   Vision   Vision Comments L eye-remains closed due to CN 3/ Oculomotor nerve palsy; patient reported seeing only shadows out of L eye. Educated on visual scanning especially to the L during mobility. Encouraged to perform eye movements multiple times during the day as able-use of mirror for visual feedback.   Other Treatments   Other Treatments Provided Discussed about DC recommendations-patient reported that his RLE appears to be more stiff and slightly weaker than baseline, would like to begin PT for the same. Prefers HH at this time. Reinforced importance of daily and frequent mobility, OOB to chair for  meals and ambulate multiple times during the day.   Balance Assessment   Sitting Balance (Static) Fair +   Sitting Balance (Dynamic) Fair   Standing Balance (Static) Fair   Standing Balance (Dynamic) Fair -   Weight Shift Sitting Good   Weight Shift Standing Fair   Comments Seated EOB; Standing W/O AD-Patient reaching out for additional support/ hand rail during ambulation in the hallway   Bed Mobility    Supine to Sit Supervised   Sit to Supine Supervised   Scooting Supervised   Rolling Supervised  (Roll to L)   Comments HOB flat, without use of rails, towards L side; Patient encouraged to perform the task without bed rail, since he was requesting to hold onto it. Increased time & effort to complete the task   Gait Analysis   Gait Level Of Assist Contact Guard Assist   Assistive Device None   Distance (Feet) 100   Deviation Decreased Base Of Support;Bradykinetic;Shuffled Gait;Other (Comment)  (Mild unsteadiness possibly due to limb length discrepancy.)   # of Stairs Climbed 2  (x2 times)   Level of Assist with Stairs Standby Assist   Comments Ambulated in the hallway-use of hand rails; cues for pacing, visual scanning, safety. Patient has orthopedic shoe for his R foot that has shoe lift.  Encouraged to continue using that and use of SPC for additional balance and compensate for visual disturbance. Negotiated stairs with B/L rails, no loss of balance.   Functional Mobility   Sit to Stand Contact Guard Assist  (RLE-goes into adduction, ER with knee in flexion prior to sitting and standing)   Bed, Chair, Wheelchair Transfer Contact Guard Assist   Transfer Method Stand Step   Mobility Bed-EOB-sit-stand-ambulate to the chair-sit-stand-ambulate in the room & hallway-negotiate stairs-ambulate in the hallway & room-EOB-bed   Comments W/O AD; cues for hand placement   6 Clicks Assessment - How much HELP from from another person do you currently need... (If the patient hasn't done an activity recently, how much help from  another person do you think he/she would need if he/she tried?)   Turning from your back to your side while in a flat bed without using bedrails? 4   Moving from lying on your back to sitting on the side of a flat bed without using bedrails? 4   Moving to and from a bed to a chair (including a wheelchair)? 3   Standing up from a chair using your arms (e.g., wheelchair, or bedside chair)? 3   Walking in hospital room? 3   Climbing 3-5 steps with a railing? 3   6 clicks Mobility Score 20   Patient / Family Goals    Patient / Family Goal #1 To return home   Short Term Goals    Short Term Goal # 1 Patient will perform sit-stand with LRAD with supervision in 6 visits to progress with functional mobility   Short Term Goal # 2 Patient will perform chair transfers with LRAD with supervision in 6 visits to safely get OOB to chair   Short Term Goal # 3 Patient will ambulate 200 feet with LRAD with supervision in 6 visits to safely ambulate household and limited community distance   Short Term Goal # 4 Patient will negotiate 3 steps with B/L rails with supervision in 6 visits to safely get in & out home   Education Group   Education Provided Role of Physical Therapist   Role of Physical Therapist Patient Response Patient;Acceptance;Explanation;Verbal Demonstration   Physical Therapy Initial Treatment Plan    Treatment Plan  Equipment;Family / Caregiver Training;Gait Training;Neuro Re-Education / Balance;Stair Training;Therapeutic Activities   Treatment Frequency 3 Times per Week   Duration Until Therapy Goals Met   Problem List    Problems Impaired Transfers;Impaired Ambulation;Impaired Balance   Anticipated Discharge Equipment and Recommendations   DC Equipment Recommendations Single Point Cane   Discharge Recommendations Recommend home health for continued physical therapy services   Interdisciplinary Plan of Care Collaboration   IDT Collaboration with  Nursing;Occupational Therapist;   Patient Position at End  of Therapy Seated;In Bed;Call Light within Reach;Tray Table within Reach;Phone within Reach  (No bed alarm on PT's arrival, RN made aware)   Session Information   Date / Session Number  7/28-1(1/3, 8/3)

## 2024-07-28 NOTE — PROGRESS NOTES
Hospital Medicine Daily Progress Note    Date of Service  7/28/2024    Chief Complaint  Aguila Botello is a 79 y.o. male admitted 7/25/2024 with tunnel palsy    Hospital Course  79 male with past medical history of hypertension, dental caries referred by his dentist for hypertension and left eye ptosis.  Patient was seen by his dentist for dental extraction and noted to have significant elevated blood pressure and ptosis for which he was referred to ED for further evaluation.  On arrival to ED noted to have odontogenic apical abscess on CT maxillofacial.  CT head CTA neck negative for acute stroke/hemorrhage.  S/p dental extraction for ongoing periapical abscess.  Neurology consulted on the case for evaluation of a oculomotor nerve palsy.  MRI brain, orbits unremarkable.  Plan is to get LP for further evaluation of Infectious/inflammatory process      Interval Problem Update    7/28/2024  Seen and examined at bedside  Vitals remained stable  Remain afebrile  Labs reviewed noted leukocytosis white count 15.4, hemoglobin 13.4, blood culture negative to mild pulmonary marcela unremarkable  MRI brain, orbits reviewed which is unremarkable for acute finding    Telemetry monitoring  Continue IV antibiotics  Neurochecks every 4 hours  Repeat CBC in a.m. to monitor white count and hemoglobin  Need close monitoring of blood counts, electrolytes and renal function due to potential of organ dysfunction due to ongoing sepsis.  Need to monitor closely for toxicity while on IV antibiotics  Scheduled to have lumbar puncture for further evaluation of ongoing abdominal palsy to rule out inflammatory/infectious process  Case discussed with neurology Dr. Kyle    I have discussed this patient's plan of care and discharge plan at IDT rounds today with Case Management, Nursing, Nursing leadership, and other members of the IDT team.    Consultants/Specialty  infectious disease and neurology    Code Status  Full Code    Disposition  The  [Mother] : mother patient is not medically cleared for discharge to home or a post-acute facility.  Anticipate discharge to: home with close outpatient follow-up    I have placed the appropriate orders for post-discharge needs.    Review of Systems  Review of Systems   Eyes:  Blurred vision: ptosis.   Musculoskeletal:  Positive for joint pain.   Neurological:  Negative for sensory change, speech change, focal weakness and weakness.        Physical Exam  Temp:  [36.1 °C (96.9 °F)-36.9 °C (98.5 °F)] 36.1 °C (96.9 °F)  Pulse:  [] 74  Resp:  [16-17] 17  BP: (108-149)/(57-91) 131/65  SpO2:  [90 %-98 %] 95 %    Physical Exam  Constitutional:       Appearance: Normal appearance.   HENT:      Head:      Comments: Dental caries  Cardiovascular:      Rate and Rhythm: Normal rate and regular rhythm.      Pulses: Normal pulses.      Heart sounds: Normal heart sounds.   Pulmonary:      Effort: Pulmonary effort is normal.   Abdominal:      General: Abdomen is flat.   Neurological:      Mental Status: He is alert and oriented to person, place, and time.      Cranial Nerves: Cranial nerve deficit present.      Comments: Left eye ptosis, disconjugate gaze, normal sized pupils, reactive to light.         Fluids    Intake/Output Summary (Last 24 hours) at 7/28/2024 1531  Last data filed at 7/28/2024 0436  Gross per 24 hour   Intake --   Output 275 ml   Net -275 ml       Laboratory  Recent Labs     07/26/24  0340 07/27/24  0358 07/28/24  0332   WBC 17.3* 12.8* 15.4*   RBC 4.44* 4.10* 4.12*   HEMOGLOBIN 14.5 13.4* 13.4*   HEMATOCRIT 42.0 38.9* 40.1*   MCV 94.6 94.9 97.3   MCH 32.7 32.7 32.5   MCHC 34.5 34.4 33.4   RDW 46.2 47.3 48.7   PLATELETCT 291 264 275   MPV 10.9 10.8 11.1     Recent Labs     07/26/24  0340 07/27/24  0358 07/28/24  0332   SODIUM 137 138 138   POTASSIUM 3.7 3.7 4.3   CHLORIDE 103 106 103   CO2 21 20 23   GLUCOSE 100* 111* 146*   BUN 14 16 18   CREATININE 0.72 0.76 0.97   CALCIUM 8.6 8.6 8.5     Recent Labs     07/25/24  2731  07/26/24  1537   APTT 25.1  --    INR 1.02 1.07               Imaging  MR-ORBITS,FACE,NECK-WITH&W/O & SEQUENCES   Final Result      MRI of the orbits without and with contrast within normal limits.      MR-BRAIN-WITH & W/O   Final Result      1.  No evidence of acute territorial infarct, intracranial hemorrhage or mass lesion.   2.  Mild diffuse cerebral substance loss.   3.  Mild microangiopathic ischemic change versus demyelination or gliosis.      [id number]   Final Result      1.  Thin rim of lucency about the second from last left mandibular molar which may represent an early odontogenic apical abscess.   2.  Multiple missing teeth.      CT-MAXILLOFACIAL W/O PLUS RECONS   Final Result         1.  No enhancing soft tissue fluid collection identified to indicate abscess.   2.  Bony lucency adjacent to the tip of the right maxillary second premolar, likely related to odontogenic apical abscess.   3.  Atherosclerosis      CT-CTV HEAD WITH & W/O POST PROCESS   Final Result      There is no evidence of dural venous sinus thrombosis.      CT-CTA NECK WITH & W/O-POST PROCESSING   Final Result      1.  CT angiogram of the neck within normal limits.   2.  Possible 3 mm right distal internal carotid artery aneurysm versus infundibulum. Finding could be further evaluated with conventional angiography on a nonemergent basis.      Findings were conveyed to Dr. KAREN GARCIA on 7/25/2024 7:51 PM via electronic messaging.      CT-CTA HEAD WITH & W/O-POST PROCESS   Final Result   Addendum (preliminary) 1 of 1   Questionable 3 mm outpouching in the right distal internal carotid artery    could be aneurysm or prominent infundibulum.      Final         1. No hemodynamically significant narrowing of the major intracranial vessels.           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  Plan is to get LP for further evaluation of Infectious/inflammatory process    Sepsis with organ dysfunction (HCC)  Assessment & Plan  Sepsis resolved  Continue IV antibiotic  Follow culture  Likely switch to oral antibiotic tomorrow    Dental abscess  Assessment & Plan  S/p dental extraction for ongoing periapical abscess  Follow blood culture    Hypertensive urgency  Assessment & Plan  Optimize blood pressure on Norvasc  Started on lisinopril  On IV  hydralazine as needed    Advanced care planning/counseling discussion  Assessment & Plan  Full code per admitting provider           VTE prophylaxis: lovenox    I have performed a physical exam and reviewed and updated ROS and Plan today (7/28/2024). In review of yesterday's note (7/27/2024), there are no changes except as documented above.

## 2024-07-28 NOTE — PROCEDURES
Procedure Note    Date: 7/28/2024  Time: 4:30 PM    Procedure: Lumbar puncture    Indication: for further evaluation of Infectious/inflammatory process   Consent: Informed consent obtained from patient or designated decision maker after explaining the benefits/risks of the procedure including but not limited to bleeding, infection, nerve or other deep structure injury, paralysis, or death. Patient or surrogate expressed understanding and agreement and signed consent which can be found in the patient's chart.    Procedure: After obtaining consent, a time-out was performed. Appropriate site confirmed with landmarks and patient positioned, prepped, and draped in sterile fashion. All those present wearing cap and mask and those physically participating remained sterile with cap, mask, and gloves. 1 mL of local anesthetic injected (1% lidocaine without epinephrine) achieving appropriate comfort level for patient. Using a spinal needle, CSF was obtained. After obtaining an appropriate volume of CSF, needle removed with stylet in place and site bandaged. Patient instructed to lay flat for 30-60 minutes as tolerated. Patient tolerated procedure well without any difficulties and left in care of bedside nurse.     CSF appearance: clear  CSF amount collected: 15 cc      EBL: minimal  Complications: none     Sample will be sent to the lab for the appropriate studies.    Edy Ayers DO

## 2024-07-28 NOTE — CARE PLAN
The patient is Stable - Low risk of patient condition declining or worsening    Shift Goals  Clinical Goals: Stable neuro status, hemodynamic stability  Patient Goals: Rest  Family Goals: SHERRIE    Progress made toward(s) clinical / shift goals:      Problem: Neuro Status  Goal: Neuro status will remain stable or improve  Outcome: Progressing    Q4H neuro checks in place. Pt's neurological status (A/Ox4) remains unchanged throughout shift. Bed alarm is on, call light within reach.     Problem: Hemodynamics  Goal: Patient's hemodynamics, fluid balance and neurologic status will be stable or improve  Outcome: Progressing   Pt's blood pressure and heart rate closely monitored throughout shift. PRN blood pressure medications available to keep SBP within set parameters.        Patient is not progressing towards the following goals:

## 2024-07-28 NOTE — THERAPY
Occupational Therapy   Initial Evaluation     Patient Name: Aguila Botello  Age:  79 y.o., Sex:  male  Medical Record #: [id number]  Today's Date: 7/28/2024     Precautions: Fall Risk    Assessment  Patient is 79 y.o. male admitted from dentist office for HTN and L eye ptosis noted to have odontogenic apical abscess on CT maxillofacial, imaging negative for CVA, now s/p dental extraction for ongoing periapical abscess. 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 He was able to demo adaptive techniques for dressing 2/2 difficulty with R hip external rotation, and adaptive techniques for toileting/toilet txf. He has support from his two son's whom he lives with upon DC. Recommend HHOT services. No further acute care needs.    Plan    Occupational Therapy Initial Treatment Plan   Duration: (P) Discharge Needs Only    DC Equipment Recommendations: (P) None  Discharge Recommendations: (P) Recommend home health for continued occupational therapy services        07/28/24 1133   Prior Living Situation   Housing / Facility [address]   Bathroom Set up Walk In Shower;Bathtub / Shower Combination;Shower Chair   Equipment Owned Single Point Cane;Tub / Shower Seat   Lives with - Patient's Self Care Capacity Adult Children   Comments Lives with two sons, one works remote, they assist with all IADLs at baseline   Prior Level of ADL Function   Self Feeding Independent   Grooming / Hygiene Independent   Bathing Independent   Dressing Independent   Toileting Independent   Prior Level of IADL Function   Medication Management Independent   Laundry Requires Assist   Kitchen Mobility Independent   Finances Independent   Home Management Requires Assist   Shopping Requires Assist   Driving / Transportation Relatives / Others Provide Transportation   Precautions   Precautions Fall Risk   Cognition    Cognition / Consciousness WDL   Comments  pleasant/agreeable   Active ROM Upper Body   Active ROM Upper Body  WDL   Strength Upper Body   Upper Body Strength  WDL   Coordination Upper Body   Comments slightly tremulous   Balance Assessment   Sitting Balance (Static) Fair +   Sitting Balance (Dynamic) Fair   Standing Balance (Static) Fair   Standing Balance (Dynamic) Fair -   Weight Shift Sitting Good   Weight Shift Standing Fair   Comments with FWW   Bed Mobility    Supine to Sit Supervised   Sit to Supine Supervised   Scooting Supervised   ADL Assessment   Grooming Supervision;Standing   Upper Body Dressing Supervision   Lower Body Dressing Supervision   Toileting Supervision   Comments has adaptive techniques for donning LB clothing 2/2 difficulty with R hip external rotation with long hx of hip replacements (since he was 6 yo)   How much help from another person does the patient currently need...   6 Clicks Daily Activity Score 24   Functional Mobility   Sit to Stand Supervised   Bed, Chair, Wheelchair Transfer Supervised   Toilet Transfers Supervised   Visual Perception   Visual Perception  X   Comments L eye shut 2/2 palsy, baseline low vision in L eye, able to see lights/shapes   Patient / Family Goals   Patient / Family Goal #1 to go home   Education Group   Role of Occupational Therapist Patient Response Patient;Acceptance;Explanation   Occupational Therapy Initial Treatment Plan    Duration Discharge Needs Only   Anticipated Discharge Equipment and Recommendations   DC Equipment Recommendations None   Discharge Recommendations Recommend home health for continued occupational therapy services   Interdisciplinary Plan of Care Collaboration   IDT Collaboration with  Nursing;Physical Therapist   Patient Position at End of Therapy In Bed;Bed Alarm On;Call Light within Reach;Tray Table within Reach;Phone within Reach   Collaboration Comments RN aware   Session Information   Date / Session Number  7/28 DC OT

## 2024-07-28 NOTE — PROGRESS NOTES
"Referring Physician: Martin Kim M.D.    S:  Oral surgery \"went well\" according the patient. Left eye symptoms are unchanged.  Denies any new neurologic symptoms. No headache or eye pain.     Interval MRI brain and orbits were completed.     Scheduled Medications   Medication Dose Frequency    senna-docusate  2 Tablet Q EVENING    lisinopril  5 mg Q DAY    amLODIPine  5 mg Q DAY    enoxaparin (LOVENOX) injection  40 mg DAILY AT 1800    ampicillin-sulbactam (UNASYN) IV  3 g Q6HRS     O:    Vitals:    07/28/24 0706   BP: 131/65   Pulse: 74   Resp: 17   Temp: 36.1 °C (96.9 °F)   SpO2: 95%     Mental status: Awake and alert.  Attention is intact.  Answers questions appropriately.  Language: Fluent with intact comprehension.  No dysarthria.  Cranial nerves:   Pupils are round and reactive to light. 1mm anisocoria (OS>OD) and sluggish on the left.    Disconjugate with the left eye laterally deviated and downward.  Ductions are full OD.  Ductions OS with relatively preserved abduction.  Cannot adduct, depress or elevate OS.   Complete ptosis on the left.               Hearing is intact to conversation  Motor: Moves all extremities. Antigravity. No abnormal movements or postures.   Reflexes: Not tested.   Coordination: No obvious dysmetria.  Gait: Not tested.     MR-ORBITS,FACE,NECK-WITH&W/O & SEQUENCES   Final Result      MRI of the orbits without and with contrast within normal limits.      MR-BRAIN-WITH & W/O   Final Result      1.  No evidence of acute territorial infarct, intracranial hemorrhage or mass lesion.   2.  Mild diffuse cerebral substance loss.   3.  Mild microangiopathic ischemic change versus demyelination or gliosis.      [id number]   Final Result      1.  Thin rim of lucency about the second from last left mandibular molar which may represent an early odontogenic apical abscess.   2.  Multiple missing teeth.      CT-MAXILLOFACIAL W/O PLUS RECONS   Final Result         1.  No enhancing soft " tissue fluid collection identified to indicate abscess.   2.  Bony lucency adjacent to the tip of the right maxillary second premolar, likely related to odontogenic apical abscess.   3.  Atherosclerosis      CT-CTV HEAD WITH & W/O POST PROCESS   Final Result      There is no evidence of dural venous sinus thrombosis.      CT-CTA NECK WITH & W/O-POST PROCESSING   Final Result      1.  CT angiogram of the neck within normal limits.   2.  Possible 3 mm right distal internal carotid artery aneurysm versus infundibulum. Finding could be further evaluated with conventional angiography on a nonemergent basis.      Findings were conveyed to Dr. KAREN GARCIA on 7/25/2024 7:51 PM via electronic messaging.      CT-CTA HEAD WITH & W/O-POST PROCESS   Final Result   Addendum (preliminary) 1 of 1   Questionable 3 mm outpouching in the right distal internal carotid artery    could be aneurysm or prominent infundibulum.      Final         1. No hemodynamically significant narrowing of the major intracranial vessels.        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  Extensive neuroimaging workup has been unrevealing to include CTA of the head and neck, CTV of the head, MRI of the brain and orbits with and without contrast.  Furthermore I discussed with interventional neuroradiology who reviewed available neuro-imaging and neurovascular imaging.  No evidence of aneurysm or 3rd nerve compression.  Catheter angiography not recommended at this time as unlikely to provide additional data beyond that which is already been obtained.    Further discussion-no history to support a traumatic etiology.  Neurovascular imaging as previously described without evidence of aneurysm.  No evidence of subarachnoid hemorrhage.  Does not have history of migraine to suggest ophthalmoplegic migraine.  There were no clear lesions of the cavernous  sinus.  There were no imaging features to suggest an orbital or superior orbital fissure, orbital apex process.  No midbrain or brainstem lesions.  At this point the presumed etiology would be an ischemic 3rd nerve palsy perhaps in the setting of microvascular disease.  Unlikely to be giant cell arteritis however I recommended sending inflammatory markers.  Additional differential consideration is myasthenia gravis with isolated ocular involvement however the very subtle pupillary involvement would be unusual/atypical.  Low suspicion for however cannot definitively rule out an underlying infectious, inflammatory or neoplastic process involving the subarachnoid space or affecting the meninges.    -Follow-up AChR receptor antibodies.  -Check ESR and CRP if not sent already.   -Lumbar puncture for CSF analysis to help rule out potential infectious/inflammatory or neoplastic processes.  -qshift neuro checks.  -Will need outpatient neuro-ophthalmology evaluation ([facility]).  Please place referral at the time of discharge.    Neurology will follow along.       I provided a total of 40 minutes of acute neurologic care for this patient encounter reviewing medical records, diagnostic studies, direct face-to-face time with the patient and/or family, documentation, communicating and coordinating plan of care.          Taran Kyle MD  Neurohospitalist, Acute Care Services          Please note that this dictation was created using voice recognition software.  I have made every reasonable attempt to correct obvious errors, but I expect that there are errors of grammar and possibly content that I did not discover before finalizing the note.

## 2024-07-29 ENCOUNTER — PHARMACY VISIT (OUTPATIENT)
Dept: PHARMACY | Facility: MEDICAL CENTER | Age: 79
End: 2024-07-29
Payer: COMMERCIAL

## 2024-07-29 VITALS
HEART RATE: 67 BPM | BODY MASS INDEX: 32.87 KG/M2 | DIASTOLIC BLOOD PRESSURE: 61 MMHG | SYSTOLIC BLOOD PRESSURE: 132 MMHG | WEIGHT: 209.44 LBS | TEMPERATURE: 98.9 F | HEIGHT: 67 IN | RESPIRATION RATE: 17 BRPM | OXYGEN SATURATION: 91 %

## 2024-07-29 LAB
ACHR BIND AB SER-SCNC: 0 NMOL/L (ref 0–0.4)
ACHR BLOCK AB/ACHR TOTAL SFR SER: 0 % (ref 0–26)
BASOPHILS # BLD AUTO: 0.3 % (ref 0–1.8)
BASOPHILS # BLD: 0.05 K/UL (ref 0–0.12)
CYTOLOGY REG CYTOL: NORMAL
EOSINOPHIL # BLD AUTO: 0.03 K/UL (ref 0–0.51)
EOSINOPHIL NFR BLD: 0.2 % (ref 0–6.9)
ERYTHROCYTE [DISTWIDTH] IN BLOOD BY AUTOMATED COUNT: 48.7 FL (ref 35.9–50)
HCT VFR BLD AUTO: 38 % (ref 42–52)
HGB BLD-MCNC: 12.8 G/DL (ref 14–18)
IMM GRANULOCYTES # BLD AUTO: 0.07 K/UL (ref 0–0.11)
IMM GRANULOCYTES NFR BLD AUTO: 0.4 % (ref 0–0.9)
LYMPHOCYTES # BLD AUTO: 4.26 K/UL (ref 1–4.8)
LYMPHOCYTES NFR BLD: 26.6 % (ref 22–41)
MCH RBC QN AUTO: 32.9 PG (ref 27–33)
MCHC RBC AUTO-ENTMCNC: 33.7 G/DL (ref 32.3–36.5)
MCV RBC AUTO: 97.7 FL (ref 81.4–97.8)
MONOCYTES # BLD AUTO: 1.39 K/UL (ref 0–0.85)
MONOCYTES NFR BLD AUTO: 8.7 % (ref 0–13.4)
MYCOBACTERIUM SPEC CULT: NORMAL
NEUTROPHILS # BLD AUTO: 10.23 K/UL (ref 1.82–7.42)
NEUTROPHILS NFR BLD: 63.8 % (ref 44–72)
NRBC # BLD AUTO: 0 K/UL
NRBC BLD-RTO: 0 /100 WBC (ref 0–0.2)
PLATELET # BLD AUTO: 262 K/UL (ref 164–446)
PMV BLD AUTO: 11.3 FL (ref 9–12.9)
RBC # BLD AUTO: 3.89 M/UL (ref 4.7–6.1)
RHODAMINE-AURAMINE STN SPEC: NORMAL
RHODAMINE-AURAMINE STN SPEC: NORMAL
SIGNIFICANT IND 70042: NORMAL
SIGNIFICANT IND 70042: NORMAL
SITE SITE: NORMAL
SITE SITE: NORMAL
SOURCE SOURCE: NORMAL
SOURCE SOURCE: NORMAL
WBC # BLD AUTO: 16 K/UL (ref 4.8–10.8)

## 2024-07-29 PROCEDURE — RXMED WILLOW AMBULATORY MEDICATION CHARGE: Performed by: STUDENT IN AN ORGANIZED HEALTH CARE EDUCATION/TRAINING PROGRAM

## 2024-07-29 PROCEDURE — 700102 HCHG RX REV CODE 250 W/ 637 OVERRIDE(OP): Performed by: STUDENT IN AN ORGANIZED HEALTH CARE EDUCATION/TRAINING PROGRAM

## 2024-07-29 PROCEDURE — A9270 NON-COVERED ITEM OR SERVICE: HCPCS | Performed by: STUDENT IN AN ORGANIZED HEALTH CARE EDUCATION/TRAINING PROGRAM

## 2024-07-29 PROCEDURE — 700111 HCHG RX REV CODE 636 W/ 250 OVERRIDE (IP): Mod: JZ | Performed by: STUDENT IN AN ORGANIZED HEALTH CARE EDUCATION/TRAINING PROGRAM

## 2024-07-29 PROCEDURE — 99239 HOSP IP/OBS DSCHRG MGMT >30: CPT | Performed by: STUDENT IN AN ORGANIZED HEALTH CARE EDUCATION/TRAINING PROGRAM

## 2024-07-29 PROCEDURE — 700105 HCHG RX REV CODE 258: Performed by: STUDENT IN AN ORGANIZED HEALTH CARE EDUCATION/TRAINING PROGRAM

## 2024-07-29 PROCEDURE — 99232 SBSQ HOSP IP/OBS MODERATE 35: CPT | Performed by: PSYCHIATRY & NEUROLOGY

## 2024-07-29 PROCEDURE — 85025 COMPLETE CBC W/AUTO DIFF WBC: CPT

## 2024-07-29 RX ORDER — CEFDINIR 300 MG/1
300 CAPSULE ORAL 2 TIMES DAILY
Qty: 8 CAPSULE | Refills: 0 | Status: SHIPPED | OUTPATIENT
Start: 2024-07-29 | End: 2024-07-29

## 2024-07-29 RX ORDER — AMLODIPINE BESYLATE 5 MG/1
5 TABLET ORAL DAILY
Qty: 30 TABLET | Refills: 0 | Status: SHIPPED | OUTPATIENT
Start: 2024-07-30 | End: 2024-08-29

## 2024-07-29 RX ORDER — LISINOPRIL 5 MG/1
5 TABLET ORAL DAILY
Qty: 30 TABLET | Refills: 0 | Status: SHIPPED | OUTPATIENT
Start: 2024-07-29 | End: 2024-08-28

## 2024-07-29 RX ADMIN — AMPICILLIN AND SULBACTAM 3 G: 1; 2 INJECTION, POWDER, FOR SOLUTION INTRAMUSCULAR; INTRAVENOUS at 00:03

## 2024-07-29 RX ADMIN — AMPICILLIN AND SULBACTAM 3 G: 1; 2 INJECTION, POWDER, FOR SOLUTION INTRAMUSCULAR; INTRAVENOUS at 12:21

## 2024-07-29 RX ADMIN — AMLODIPINE BESYLATE 5 MG: 5 TABLET ORAL at 04:34

## 2024-07-29 RX ADMIN — LISINOPRIL 5 MG: 5 TABLET ORAL at 04:34

## 2024-07-29 RX ADMIN — AMPICILLIN AND SULBACTAM 3 G: 1; 2 INJECTION, POWDER, FOR SOLUTION INTRAMUSCULAR; INTRAVENOUS at 04:38

## 2024-07-29 NOTE — CARE PLAN
The patient is Stable - Low risk of patient condition declining or worsening    Shift Goals  Clinical Goals: stable neuro, DC with home health  Patient Goals: get home and rest  Family Goals: no family present    Progress made toward(s) clinical / shift goals:  stable neuro, moving towards DC    Patient is not progressing towards the following goals:      Problem: Neuro Status  Goal: Neuro status will remain stable or improve  Description: Target End Date:  Prior to discharge or change in level of care    Document on Neuro assessment in the Assessment flowsheet    1.  Assess and monitor neurologic status per provider order/protocol/unit policy  2.  Assess level of consciousness and orientation  3.  Assess for speech, dysarthria, dysphagia, facial symmetry  4.  Assess visual field, eye movements, gaze preference, pupil reaction and size  5.  Assess muscle strength and motor response in all four extremities  6.  Assess for sensation (numbness and tingling)  7.  Assess basic neuro reflexes (cough, gag, corneal)  8.  Identify changes in neuro status and report to provider for testing/treatment orders  Outcome: Not Progressing     Problem: Fall Risk  Goal: Patient will remain free from falls  Description: Target End Date:  Prior to discharge or change in level of care    Document interventions on the Paige Jeffrey Fall Risk Assessment    1.  Assess for fall risk factors  2.  Implement fall precautions  Outcome: Not Progressing     Problem: Pain - Standard  Goal: Alleviation of pain or a reduction in pain to the patient’s comfort goal  Description: Target End Date:  Prior to discharge or change in level of care    Document on Vitals flowsheet    1.  Document pain using the appropriate pain scale per order or unit policy  2.  Educate and implement non-pharmacologic comfort measures (i.e. relaxation, distraction, massage, cold/heat therapy, etc.)  3.  Pain management medications as ordered  4.  Reassess pain after pain med  administration per policy  5.  If opiods administered assess patient's response to pain medication is appropriate per POSS sedation scale  6.  Follow pain management plan developed in collaboration with patient and interdisciplinary team (including palliative care or pain specialists if applicable)  Outcome: Not Progressing

## 2024-07-29 NOTE — CARE PLAN
The patient is Stable - Low risk of patient condition declining or worsening    Shift Goals  Clinical Goals: Monitor BP, safety  Patient Goals: Rest  Family Goals: SHERRIE    Progress made toward(s) clinical / shift goals:  Progressing    Patient is not progressing towards the following goals:

## 2024-07-29 NOTE — DISCHARGE PLANNING
Case Management Discharge Planning    Admission Date: 7/25/2024  GMLOS: 2.1  ALOS: 4    6-Clicks ADL Score: 24  6-Clicks Mobility Score: 20      Anticipated Discharge Dispo: Discharge Disposition: D/T to home under HHA care in anticipation of covered skilled care (06)    DME Needed: No    Action(s) Taken: Updated Provider/Nurse on Discharge Plan  RN CM met with PT at bedside, per Pt Dr. Condon is not in network with his insurance. RN Rosita called [facility] scheduling, per Kiersten Pt's insurance is not in network with [facility] thus she cannot set up appointment for PCP for the Pt. RN CM called [facility], per [facility], Pt's referral is still on review and  [facility] can help with Pt's PCP once accepted. [facility] to call RN CM once decision is determined. Dr. Kim and bedside nurse updated.     1500- RN CM received a call from [facility] that they are accepted the Pt and Pt's PCP is SHIRAZ Perez.     Escalations Completed: Provider and Bedside RN    Medically Clear: Yes    Next Steps: Follow up acceptance with [facility]    Barriers to Discharge: Outpatient referrals pending    Is the patient up for discharge tomorrow: No

## 2024-07-29 NOTE — PROGRESS NOTES
Patient to be discharged today - patient aware and agreeable to plan. D/c instructions reviewed with patient - ?'s/concerns answered. 1 piv present and meds to beds handed to patient.

## 2024-07-29 NOTE — DISCHARGE PLANNING
Case Management Discharge Planning    Admission Date: 7/25/2024  GMLOS: 2.1  ALOS: 4    6-Clicks ADL Score: 24  6-Clicks Mobility Score: 20      Anticipated Discharge Dispo: Discharge Disposition: D/T to home under HHA care in anticipation of covered skilled care (06)    DME Needed: No    Action(s) Taken: DC Assessment Complete (See below), Choice obtained, and Referral(s) sent    Escalations Completed: Provider    Medically Clear: No    Next Steps: f/u with pt and medical team to discuss dc needs and barriers.       Barriers to Discharge: Medical clearance and Outpatient referrals pending    Is the patient up for discharge tomorrow: No      Care Transition Team Assessment    RN CM met with pt at bedside to complete assessment. Pt A&Ox4 and able to verify the information on the face sheet. Pt lives with his 2 adults sons, one is working from home, in a one story house in [place]. On Baseline, Pt was independent  in all his ADL's and IADL's. Pt has not had previous HH nor does he use DME or home O2. Pt has no listed PCP but Pt will call Dr. Geraldo Condon ([phone]) to set up PCP initial appointment.     Pt is agreeable with . HH choice obtained, received verbal approval for [facility] . Fax to [facility] for processing.       Information Source  Orientation Level: Oriented X4  Information Given By: Patient  Who is responsible for making decisions for patient? : Patient    Readmission Evaluation  Is this a readmission?: No    Elopement Risk  Legal Hold: No  Ambulatory or Self Mobile in Wheelchair: Yes  Disoriented: No  Psychiatric Symptoms: None  History of Wandering: No  Elopement this Admit: No  Vocalizing Wanting to Leave: No  Displays Behaviors, Body Language Wanting to Leave: No-Not at Risk for Elopement  Elopement Risk: Not at Risk for Elopement    Interdisciplinary Discharge Planning  Lives with - Patient's Self Care Capacity: Adult Children (Son x 2)  Patient or legal guardian wants to designate a  caregiver: No  Support Systems: Children, Spouse / Significant Other  Housing / Facility: [address]  Prior Services: Intermittent Physical Support for ADL Per Family    Discharge Preparedness  What is your plan after discharge?: Home health care  What are your discharge supports?: Child  Prior Functional Level: Ambulatory, Independent with Activities of Daily Living  Difficulity with ADLs: None  Difficulity with IADLs: None    Functional Assesment  Prior Functional Level: Ambulatory, Independent with Activities of Daily Living    Finances  Financial Barriers to Discharge: No  Prescription Coverage: Yes    Vision / Hearing Impairment  Vision Impairment : Yes  Right Eye Vision: Impaired, Wears Glasses  Left Eye Vision: Impaired, Wears Glasses (very poor vision at baseline)  Hearing Impairment : No         Advance Directive  Advance Directive?: None    Domestic Abuse  Have you ever been the victim of abuse or violence?: No  Possible Abuse/Neglect Reported to:: Not Applicable    Psychological Assessment  History of Substance Abuse: None    Discharge Risks or Barriers  Discharge risks or barriers?: Complex medical needs  Patient risk factors: Complex medical needs    Anticipated Discharge Information  Discharge Disposition: D/T to home under A care in anticipation of covered skilled care (06)

## 2024-07-29 NOTE — PROGRESS NOTES
Referring Physician: Martin Kim M.D.    S:  Lumbar puncture was completed. Overall feeling well. Left eye noticeably improved transiently this morning described as when he elevated the eyelid (with his finger) that it stayed up for a while and slowly drooped. Otherwise no change.     Scheduled Medications   Medication Dose Frequency    senna-docusate  2 Tablet Q EVENING    lisinopril  5 mg Q DAY    amLODIPine  5 mg Q DAY    ampicillin-sulbactam (UNASYN) IV  3 g Q6HRS     O:    Vitals:    07/29/24 0743   BP: 132/61   Pulse: 67   Resp: 17   Temp: 37.2 °C (98.9 °F)   SpO2: 91%     Mental status: Awake and alert.  Attention is intact.  Answers questions appropriately.  Language: Fluent with intact comprehension.  No dysarthria.  Cranial nerves:   Pupils are round and reactive to light. 1mm anisocoria (OS>OD) and sluggish on the left.    Disconjugate with the left eye laterally deviated and downward.  Ductions are full OD.  Ductions OS with relatively preserved abduction.  Cannot adduct, depress or elevate OS.   Complete ptosis on the left.               Hearing is intact to conversation  Motor: Moves all extremities. Antigravity. No abnormal movements or postures.   Reflexes: Not tested.   Coordination: No obvious dysmetria.  Gait: Not tested.      Latest Reference Range & Units 07/28/24 16:37   Number Of Tubes  4   Volume mL 15.0   Color-Body Fluid  Colorless   Character-Body Fluid  Clear   Supernatant Appearance  Colorless   CSF Total Nucleated Cells 0 - 10 cells/uL 2   Total RBC Count cells/uL 4   Crenated RBC % 0   CSF Tube Number  Tube 3   Comments  See Comment   Glucose CSF 40 - 80 mg/dL 92 (H)   Total Protein, CSF 15 - 45 mg/dL 47 (H)   (H): Data is abnormally high         Latest Reference Range & Units 07/26/24 20:06   Blocking Antibody 0 - 26 % 0      Latest Reference Range & Units 07/26/24 20:06   Sed Rate Westergren 0 - 20 mm/hour 16      Latest Reference Range & Units 07/26/24 20:06   Stat C-Reactive  Protein 0.00 - 0.75 mg/dL 0.59       MR-ORBITS,FACE,NECK-WITH&W/O & SEQUENCES   Final Result      MRI of the orbits without and with contrast within normal limits.      MR-BRAIN-WITH & W/O   Final Result      1.  No evidence of acute territorial infarct, intracranial hemorrhage or mass lesion.   2.  Mild diffuse cerebral substance loss.   3.  Mild microangiopathic ischemic change versus demyelination or gliosis.      [id number]   Final Result      1.  Thin rim of lucency about the second from last left mandibular molar which may represent an early odontogenic apical abscess.   2.  Multiple missing teeth.      CT-MAXILLOFACIAL W/O PLUS RECONS   Final Result         1.  No enhancing soft tissue fluid collection identified to indicate abscess.   2.  Bony lucency adjacent to the tip of the right maxillary second premolar, likely related to odontogenic apical abscess.   3.  Atherosclerosis      CT-CTV HEAD WITH & W/O POST PROCESS   Final Result      There is no evidence of dural venous sinus thrombosis.      CT-CTA NECK WITH & W/O-POST PROCESSING   Final Result      1.  CT angiogram of the neck within normal limits.   2.  Possible 3 mm right distal internal carotid artery aneurysm versus infundibulum. Finding could be further evaluated with conventional angiography on a nonemergent basis.      Findings were conveyed to Dr. KAREN GARCIA on 7/25/2024 7:51 PM via electronic messaging.      CT-CTA HEAD WITH & W/O-POST PROCESS   Final Result   Addendum (preliminary) 1 of 1   Questionable 3 mm outpouching in the right distal internal carotid artery    could be aneurysm or prominent infundibulum.      Final         1. No hemodynamically significant narrowing of the major intracranial vessels.        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  Extensive neuroimaging workup has been unrevealing to include CTA of  the head and neck, CTV of the head, MRI of the brain and orbits with and without contrast.  Furthermore I discussed with interventional neuroradiology who reviewed available neuro-imaging and neurovascular imaging.  No evidence of aneurysm or 3rd nerve compression.  Catheter angiography not recommended at this time as unlikely to provide additional data beyond that which is already been obtained.    Further discussion-no history to support a traumatic etiology.  Neurovascular imaging as previously described without evidence of aneurysm.  No evidence of subarachnoid hemorrhage.  Does not have history of migraine to suggest ophthalmoplegic migraine.  There were no clear lesions of the cavernous sinus.  There were no imaging features to suggest an orbital or superior orbital fissure, orbital apex process.  No midbrain or brainstem lesions.  At this point the presumed etiology would be an ischemic 3rd nerve palsy perhaps in the setting of microvascular disease.  Unlikely to be giant cell arteritis with negative inflammatory markers.  Additional differential consideration is myasthenia gravis with isolated ocular involvement however the very subtle pupillary involvement would be unusual/atypical.  Blocking antibodies were 0.  Binding modulating are pending. Low suspicion for however cannot definitively rule out an underlying infectious, inflammatory or neoplastic process involving the subarachnoid space or affecting the meninges. Cytology is pending.  Very mildly elevated protein with a normal total nucleated cell count.     CSF with mildly elevated glucose consistent with peripheral levels.  Minimally elevated protein which is a nonspecific finding.  There were normal total nucleated cells.  Negative meningitis/encephalitis PCR panel.  Negative acetylcholine receptor blocking antibody.  Normal inflammatory markers.    Patient currently feels well and denies any new neurologic symptoms.  The left eye symptoms are  unchanged.    -Will need outpatient neuro-ophthalmology evaluation ([facility]).  Please place referral at the time of discharge.  -Follow-up with PCM for monitoring and management of microvascular risk factors.     Neurology will sign-off. Please reach out with any questions.       I provided a total of 35 minutes of acute neurologic care for this patient encounter reviewing medical records, diagnostic studies, direct face-to-face time with the patient and/or family, documentation, communicating and coordinating plan of care.          Taran Kyle MD  Neurohospitalist, Acute Care Services          Please note that this dictation was created using voice recognition software.  I have made every reasonable attempt to correct obvious errors, but I expect that there are errors of grammar and possibly content that I did not discover before finalizing the note.

## 2024-07-29 NOTE — DISCHARGE INSTRUCTIONS
Hypertension, Adult  Hypertension is another name for high blood pressure. High blood pressure forces your heart to work harder to pump blood. This can cause problems over time.  There are two numbers in a blood pressure reading. There is a top number (systolic) over a bottom number (diastolic). It is best to have a blood pressure that is below 120/80.  What are the causes?  The cause of this condition is not known. Some other conditions can lead to high blood pressure.  What increases the risk?  Some lifestyle factors can make you more likely to develop high blood pressure:  Smoking.  Not getting enough exercise or physical activity.  Being overweight.  Having too much fat, sugar, calories, or salt (sodium) in your diet.  Drinking too much alcohol.  Other risk factors include:  Having any of these conditions:  Heart disease.  Diabetes.  High cholesterol.  Kidney disease.  Obstructive sleep apnea.  Having a family history of high blood pressure and high cholesterol.  Age. The risk increases with age.  Stress.  What are the signs or symptoms?  High blood pressure may not cause symptoms. Very high blood pressure (hypertensive crisis) may cause:  Headache.  Fast or uneven heartbeats (palpitations).  Shortness of breath.  Nosebleed.  Vomiting or feeling like you may vomit (nauseous).  Changes in how you see.  Very bad chest pain.  Feeling dizzy.  Seizures.  How is this treated?  This condition is treated by making healthy lifestyle changes, such as:  Eating healthy foods.  Exercising more.  Drinking less alcohol.  Your doctor may prescribe medicine if lifestyle changes do not help enough and if:  Your top number is above 130.  Your bottom number is above 80.  Your personal target blood pressure may vary.  Follow these instructions at home:  Eating and drinking    If told, follow the DASH eating plan. To follow this plan:  Fill one half of your plate at each meal with fruits and vegetables.  Fill one fourth of your plate  at each meal with whole grains. Whole grains include whole-wheat pasta, brown rice, and whole-grain bread.  Eat or drink low-fat dairy products, such as skim milk or low-fat yogurt.  Fill one fourth of your plate at each meal with low-fat (lean) proteins. Low-fat proteins include fish, chicken without skin, eggs, beans, and tofu.  Avoid fatty meat, cured and processed meat, or chicken with skin.  Avoid pre-made or processed food.  Limit the amount of salt in your diet to less than 1,500 mg each day.  Do not drink alcohol if:  Your doctor tells you not to drink.  You are pregnant, may be pregnant, or are planning to become pregnant.  If you drink alcohol:  Limit how much you have to:  0-1 drink a day for women.  0-2 drinks a day for men.  Know how much alcohol is in your drink. In the [place] one drink equals one 12 oz bottle of beer (355 mL), one 5 oz glass of wine (148 mL), or one 1½ oz glass of hard liquor (44 mL).  Lifestyle    Work with your doctor to stay at a healthy weight or to lose weight. Ask your doctor what the best weight is for you.  Get at least 30 minutes of exercise that causes your heart to beat faster (aerobic exercise) most days of the week. This may include walking, swimming, or biking.  Get at least 30 minutes of exercise that strengthens your muscles (resistance exercise) at least 3 days a week. This may include lifting weights or doing Pilates.  Do not smoke or use any products that contain nicotine or tobacco. If you need help quitting, ask your doctor.  Check your blood pressure at home as told by your doctor.  Keep all follow-up visits.  Medicines  Take over-the-counter and prescription medicines only as told by your doctor. Follow directions carefully.  Do not skip doses of blood pressure medicine. The medicine does not work as well if you skip doses. Skipping doses also puts you at risk for problems.  Ask your doctor about side effects or reactions to medicines that you should watch  for.  Contact a doctor if:  You think you are having a reaction to the medicine you are taking.  You have headaches that keep coming back.  You feel dizzy.  You have swelling in your ankles.  You have trouble with your vision.  Get help right away if:  You get a very bad headache.  You start to feel mixed up (confused).  You feel weak or numb.  You feel faint.  You have very bad pain in your:  Chest.  Belly (abdomen).  You vomit more than once.  You have trouble breathing.  These symptoms may be an emergency. Get help right away. Call 911.  Do not wait to see if the symptoms will go away.  Do not drive yourself to the hospital.  Summary  Hypertension is another name for high blood pressure.  High blood pressure forces your heart to work harder to pump blood.  For most people, a normal blood pressure is less than 120/80.  Making healthy choices can help lower blood pressure. If your blood pressure does not get lower with healthy choices, you may need to take medicine.  This information is not intended to replace advice given to you by your health care provider. Make sure you discuss any questions you have with your health care provider.  Document Revised: 10/06/2022 Document Reviewed: 10/06/2022  Elsevier Patient Education © 2023 [employer]

## 2024-07-29 NOTE — DISCHARGE SUMMARY
Discharge Summary    CHIEF COMPLAINT ON ADMISSION  Chief Complaint   Patient presents with    Sent by MD       Reason for Admission  Left side droop     Admission Date  7/25/2024    CODE STATUS  Full Code    HPI & HOSPITAL COURSE    79 male with past medical history of hypertension, dental caries referred by his dentist for hypertension and left eye ptosis.  Patient was seen by his dentist for dental extraction and noted to have significant elevated blood pressure and ptosis for which he was referred to ED for further evaluation.  On arrival to ED noted to have odontogenic apical abscess on CT maxillofacial.  CT head CTA neck negative for acute stroke/hemorrhage.  S/p dental extraction for ongoing periapical abscess.  Neurology consulted on the case for evaluation of a oculomotor nerve palsy.  MRI brain, orbits unremarkable.  LP was completed to further workup with unremarkable CSF finding.  Neurology cleared patient to discharge with neuro-ophthalmology follow-up I have placed referral to neuro-ophthalmologist.  Plan discussed with patient.  PT/OT recommended home health.   assisting with home  health and discharge.  At the time of discharge patient remain hemodynamically stable ,asymptomatic ,labs remain unremarkable .  Leukocytosis remained stable, procalcitonin negative, blood culture unremarkable.  Patient be discharged home on additional 4 days course of antibiotic to complete 7 days course  Patient will be discharged with close follow up with PCP, neuro-ophthalmologist, neurosurgery..Discharge plan was discussed with patient in details .  Patient agreed with discharge plan  and  all questions answered.    Therefore, he is discharged in good and stable condition to home with close outpatient follow-up.    The patient met 2-midnight criteria for an inpatient stay at the time of discharge.    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 *      FOLLOW UP    Faisal Samson M.D.  [address] [facility] [address]  [place] [address]  [phone]    Follow up in 1 week(s)        MEDICATIONS ON DISCHARGE     Medication List        START taking these medications        Instructions   amLODIPine 5 MG Tabs  Start taking on: July 30, 2024  Commonly known as: Norvasc   Take 1 Tablet by mouth every day for 30 days.  Dose: 5 mg     cefdinir 300 MG Caps  Commonly known as: Omnicef   Take 1 Capsule by mouth 2 times a day for 4 days.  Dose: 300 mg     lisinopril 5 MG Tabs  Commonly known as: Prinivil   Take 1 Tablet by mouth every day for 30 days.  Dose: 5 mg            CONTINUE taking these medications        Instructions   acetaminophen 325 MG Tabs  Commonly known as: Tylenol   Take 650 mg by mouth every four hours as needed.  Dose: 650 mg     ascorbic acid 500 MG tablet  Commonly known as: Vitamin C   Take 500 mg by mouth every day.  Dose: 500 mg     Calcium 600 MG Tabs   Take 600 mg by mouth every day.  Dose: 600 mg     ibuprofen 200 MG Tabs  Commonly known as: Motrin   Take 400 mg by mouth 2 times a day. 400 mg = 2 tablets  Dose: 400 mg     magnesium oxide 400 MG Tabs tablet  Commonly known as: Mag-Ox   Take 400 mg by mouth every day.  Dose: 400 mg     vitamin D3 1000 Unit (25 mcg) Tabs  Commonly known as: Cholecalciferol   Take 1,000 Units by mouth every day.  Dose: 1,000 Units     vitamin e 400 UNIT Caps  Commonly known as: Vitamin E   Take 400 Units by mouth every day.  Dose: 400 Units     zinc sulfate 220 (50 Zn) MG Caps  Commonly known as: Zincate   Take 220 mg by mouth every day.  Dose: 220 mg            STOP taking these medications      cephALEXin 500  "MG Caps  Commonly known as: Keflex              Allergies  Allergies   Allergen Reactions    Penicillin G Rash     \"When I was a child. According to my mother I got rash\"       DIET  Orders Placed This Encounter   Procedures    Diet Order Diet: Level 6 - Soft and Bite Sized; Liquid level: Level 2 - Mildly Thick     Standing Status:   Standing     Number of Occurrences:   1     Order Specific Question:   Diet:     Answer:   Level 6 - Soft and Bite Sized [23]     Order Specific Question:   Liquid level     Answer:   Level 2 - Mildly Thick       ACTIVITY  As tolerated.  Weight bearing as tolerated    CONSULTATIONS  neurolgy    PROCEDURES  MR-ORBITS,FACE,NECK-WITH&W/O & SEQUENCES   Final Result      MRI of the orbits without and with contrast within normal limits.      MR-BRAIN-WITH & W/O   Final Result      1.  No evidence of acute territorial infarct, intracranial hemorrhage or mass lesion.   2.  Mild diffuse cerebral substance loss.   3.  Mild microangiopathic ischemic change versus demyelination or gliosis.      [id number]   Final Result      1.  Thin rim of lucency about the second from last left mandibular molar which may represent an early odontogenic apical abscess.   2.  Multiple missing teeth.      CT-MAXILLOFACIAL W/O PLUS RECONS   Final Result         1.  No enhancing soft tissue fluid collection identified to indicate abscess.   2.  Bony lucency adjacent to the tip of the right maxillary second premolar, likely related to odontogenic apical abscess.   3.  Atherosclerosis      CT-CTV HEAD WITH & W/O POST PROCESS   Final Result      There is no evidence of dural venous sinus thrombosis.      CT-CTA NECK WITH & W/O-POST PROCESSING   Final Result      1.  CT angiogram of the neck within normal limits.   2.  Possible 3 mm right distal internal carotid artery aneurysm versus infundibulum. Finding could be further evaluated with conventional angiography on a nonemergent basis.      Findings were " conveyed to Dr. KAREN GARCIA on 7/25/2024 7:51 PM via electronic messaging.      CT-CTA HEAD WITH & W/O-POST PROCESS   Final Result   Addendum (preliminary) 1 of 1   Questionable 3 mm outpouching in the right distal internal carotid artery    could be aneurysm or prominent infundibulum.      Final         1. No hemodynamically significant narrowing of the major intracranial vessels.            LABORATORY  Lab Results   Component Value Date    SODIUM 138 07/28/2024    POTASSIUM 4.3 07/28/2024    CHLORIDE 103 07/28/2024    CO2 23 07/28/2024    GLUCOSE 146 (H) 07/28/2024    BUN 18 07/28/2024    CREATININE 0.97 07/28/2024        Lab Results   Component Value Date    WBC 16.0 (H) 07/29/2024    HEMOGLOBIN 12.8 (L) 07/29/2024    HEMATOCRIT 38.0 (L) 07/29/2024    PLATELETCT 262 07/29/2024        Total time of the discharge process exceeds 37 minutes.

## 2024-07-30 LAB — ACHR MOD AB/ACHR TOTAL SFR SER: 0 %

## 2024-07-31 LAB
BACTERIA BLD CULT: NORMAL
BACTERIA BLD CULT: NORMAL
SIGNIFICANT IND 70042: NORMAL
SIGNIFICANT IND 70042: NORMAL
SITE SITE: NORMAL
SITE SITE: NORMAL
SOURCE SOURCE: NORMAL
SOURCE SOURCE: NORMAL

## 2024-08-02 LAB — T PALLIDUM AB CSF QL IF: NON REACTIVE

## 2024-08-06 ENCOUNTER — TELEPHONE (OUTPATIENT)
Dept: ENDOCRINOLOGY | Facility: CLINIC | Age: 79
End: 2024-08-06
Payer: COMMERCIAL

## 2024-08-06 NOTE — TELEPHONE ENCOUNTER
Called and scheduled Landry for 09/20/2024 at 9:00a.m with Max. Please do not give him his Patient Assistance until he actually comes in for his appointment.

## 2024-08-09 LAB — TEST NAME 95000: NORMAL

## 2024-08-12 NOTE — PROGRESS NOTES
Attestation signed by Sivakumar Rousseau MD on 8/12/24 at 11:19 AM.    I, Dr Sivakumar Rousseau, have reviewed this progress note, medication list, vital signs, any pertinent lab values, and any CGM data if present with the [facility] and  face to face during this visit today. This note reflects the treatment plan that was made under my direction after reviewing the above mentioned elements while face to face with the patient and CDE.  I personally answered and addressed any questions and concerns the patient had during the visit today.  The CDE entered the data in this note under my direction and I personally reviewed it, signed any lab or medication orders that I instructed to be completed. I am the billing provider for this visit and the level of service was determined by my involvement in the Medical Decision Making Component of this visit while face to face with the patient.

## 2024-08-13 ENCOUNTER — APPOINTMENT (OUTPATIENT)
Dept: ENDOCRINOLOGY | Facility: CLINIC | Age: 79
End: 2024-08-13
Payer: COMMERCIAL

## 2024-09-10 LAB
MYCOBACTERIUM SPEC CULT: NORMAL
RHODAMINE-AURAMINE STN SPEC: NORMAL
SIGNIFICANT IND 70042: NORMAL
SITE SITE: NORMAL
SOURCE SOURCE: NORMAL

## 2024-09-20 ENCOUNTER — APPOINTMENT (OUTPATIENT)
Dept: ENDOCRINOLOGY | Facility: CLINIC | Age: 79
End: 2024-09-20
Payer: COMMERCIAL

## 2024-09-20 VITALS — BODY MASS INDEX: 25.47 KG/M2 | WEIGHT: 175 LBS

## 2024-09-20 ASSESSMENT — PATIENT HEALTH QUESTIONNAIRE - PHQ9
SUM OF ALL RESPONSES TO PHQ9 QUESTIONS 1 & 2: 0
2. FEELING DOWN, DEPRESSED OR HOPELESS: NOT AT ALL
1. LITTLE INTEREST OR PLEASURE IN DOING THINGS: NOT AT ALL

## 2024-09-20 ASSESSMENT — PAIN SCALES - GENERAL: PAINLEVEL: 0-NO PAIN

## 2024-09-23 ENCOUNTER — TELEPHONE (OUTPATIENT)
Dept: PRIMARY CARE | Facility: CLINIC | Age: 79
End: 2024-09-23
Payer: COMMERCIAL

## 2024-09-23 NOTE — TELEPHONE ENCOUNTER
Incoming call received from pt's spouse, Rachell, regarding his DM medications. Pt previously seeing Dr. Rousseau at [facility] and has been getting medications through the [email or web address] Patient Assistance Program. Pt's wife states that she would now like Dr. Wiggins to manage the patient's diabetes as his primary care provider - I advised we would need new prescriptions signed by Dr. Wiggins in order to get his DM meds delivered to our office.   Pt's wife is asking about the medications at Dr. Rousseau's office currently - advised she would need to talk to that office to address that situation. Advised that per records from Dr. Rousseau's office, there has been confusion about what pt has actually been taking at home vs the medications that were getting delivered for him to the office; therefore they wanted his wife at his next appt as she manages his medications at home. She states she is not able to come into the office with him and was frustrated about the situation. I advised I would reach out to the  and have someone from that office call her.

## 2024-09-25 NOTE — TELEPHONE ENCOUNTER
Pt wife called in looking for insulin and other medications. States it should've been here in office to  and his appointment was canceled last week because she was not with. She also states she did not receive call back from someone in office for this issue. Pt's wife says she is switching him to dr. Wiggins for care. Advised Pt that  will give her a call back to help eith this issue

## 2024-10-04 ENCOUNTER — TELEPHONE (OUTPATIENT)
Dept: ENDOCRINOLOGY | Facility: CLINIC | Age: 79
End: 2024-10-04
Payer: COMMERCIAL

## 2024-10-04 NOTE — TELEPHONE ENCOUNTER
Landry Hernández   1945   [id number]   [phone]       Patient wife called the office in regards to patient not being seen due to previous office note states that spouse need to come into appt with patient to go over medication because of conflicting information. However, patient wife was upset because patient did not get his medication from Patient Assistance Program (Ozempic) that was delivered to office. and she refuses to come to appt with patient to review medications. Therefore patient wife mentioned that she will not continue care with office and will follow up with PCP to get Patient Assistance medication.     PCP  was made aware of this situation.

## 2024-10-08 DIAGNOSIS — E11.51: ICD-10-CM

## 2024-10-08 DIAGNOSIS — I10 PRIMARY HYPERTENSION: ICD-10-CM

## 2024-10-09 RX ORDER — HYDROCHLOROTHIAZIDE 25 MG/1
25 TABLET ORAL
Qty: 90 TABLET | Refills: 0 | Status: SHIPPED | OUTPATIENT
Start: 2024-10-09

## 2024-10-09 RX ORDER — PIOGLITAZONEHYDROCHLORIDE 15 MG/1
15 TABLET ORAL DAILY
Qty: 90 TABLET | Refills: 0 | Status: SHIPPED | OUTPATIENT
Start: 2024-10-09

## 2024-10-15 ENCOUNTER — APPOINTMENT (OUTPATIENT)
Dept: PRIMARY CARE | Facility: CLINIC | Age: 79
End: 2024-10-15
Payer: COMMERCIAL

## 2024-10-21 ENCOUNTER — OFFICE VISIT (OUTPATIENT)
Dept: PRIMARY CARE | Facility: CLINIC | Age: 79
End: 2024-10-21
Payer: COMMERCIAL

## 2024-10-21 ENCOUNTER — LAB (OUTPATIENT)
Dept: LAB | Facility: LAB | Age: 79
End: 2024-10-21
Payer: COMMERCIAL

## 2024-10-21 VITALS — DIASTOLIC BLOOD PRESSURE: 60 MMHG | SYSTOLIC BLOOD PRESSURE: 132 MMHG | HEART RATE: 76 BPM

## 2024-10-21 DIAGNOSIS — Z79.4 TYPE 2 DIABETES MELLITUS WITHOUT COMPLICATION, WITH LONG-TERM CURRENT USE OF INSULIN (MULTI): ICD-10-CM

## 2024-10-21 DIAGNOSIS — E11.9 TYPE 2 DIABETES MELLITUS WITHOUT COMPLICATION, WITH LONG-TERM CURRENT USE OF INSULIN (MULTI): ICD-10-CM

## 2024-10-21 DIAGNOSIS — D50.9 IRON DEFICIENCY ANEMIA, UNSPECIFIED IRON DEFICIENCY ANEMIA TYPE: ICD-10-CM

## 2024-10-21 DIAGNOSIS — Z00.00 ROUTINE GENERAL MEDICAL EXAMINATION AT HEALTH CARE FACILITY: Primary | ICD-10-CM

## 2024-10-21 LAB
ALBUMIN SERPL BCP-MCNC: 4.1 G/DL (ref 3.4–5)
ALP SERPL-CCNC: 86 U/L (ref 33–136)
ALT SERPL W P-5'-P-CCNC: 29 U/L (ref 10–52)
ANION GAP SERPL CALC-SCNC: 11 MMOL/L (ref 10–20)
AST SERPL W P-5'-P-CCNC: 27 U/L (ref 9–39)
BILIRUB SERPL-MCNC: 0.8 MG/DL (ref 0–1.2)
BUN SERPL-MCNC: 23 MG/DL (ref 6–23)
CALCIUM SERPL-MCNC: 9.8 MG/DL (ref 8.6–10.3)
CHLORIDE SERPL-SCNC: 104 MMOL/L (ref 98–107)
CHOLEST SERPL-MCNC: 155 MG/DL (ref 0–199)
CHOLESTEROL/HDL RATIO: 3.4
CO2 SERPL-SCNC: 32 MMOL/L (ref 21–32)
CREAT SERPL-MCNC: 1.4 MG/DL (ref 0.5–1.3)
EGFRCR SERPLBLD CKD-EPI 2021: 51 ML/MIN/1.73M*2
ERYTHROCYTE [DISTWIDTH] IN BLOOD BY AUTOMATED COUNT: 13.3 % (ref 11.5–14.5)
FERRITIN SERPL-MCNC: 128 NG/ML (ref 20–300)
GLUCOSE SERPL-MCNC: 75 MG/DL (ref 74–99)
HCT VFR BLD AUTO: 43 % (ref 41–52)
HDLC SERPL-MCNC: 45.9 MG/DL
HGB BLD-MCNC: 13.7 G/DL (ref 13.5–17.5)
IRON SATN MFR SERPL: 31 % (ref 25–45)
IRON SERPL-MCNC: 125 UG/DL (ref 35–150)
LDLC SERPL CALC-MCNC: 89 MG/DL
MCH RBC QN AUTO: 31 PG (ref 26–34)
MCHC RBC AUTO-ENTMCNC: 31.9 G/DL (ref 32–36)
MCV RBC AUTO: 97 FL (ref 80–100)
NON HDL CHOLESTEROL: 109 MG/DL (ref 0–149)
NRBC BLD-RTO: 0 /100 WBCS (ref 0–0)
PLATELET # BLD AUTO: 246 X10*3/UL (ref 150–450)
POC HEMOGLOBIN A1C: 7.2 % (ref 4.2–6.5)
POTASSIUM SERPL-SCNC: 4.5 MMOL/L (ref 3.5–5.3)
PROT SERPL-MCNC: 6.8 G/DL (ref 6.4–8.2)
RBC # BLD AUTO: 4.42 X10*6/UL (ref 4.5–5.9)
SODIUM SERPL-SCNC: 142 MMOL/L (ref 136–145)
TIBC SERPL-MCNC: 402 UG/DL (ref 240–445)
TRIGL SERPL-MCNC: 99 MG/DL (ref 0–149)
UIBC SERPL-MCNC: 277 UG/DL (ref 110–370)
VLDL: 20 MG/DL (ref 0–40)
WBC # BLD AUTO: 6.7 X10*3/UL (ref 4.4–11.3)

## 2024-10-21 PROCEDURE — 1159F MED LIST DOCD IN RCRD: CPT | Performed by: FAMILY MEDICINE

## 2024-10-21 PROCEDURE — 36415 COLL VENOUS BLD VENIPUNCTURE: CPT

## 2024-10-21 PROCEDURE — G0439 PPPS, SUBSEQ VISIT: HCPCS | Performed by: FAMILY MEDICINE

## 2024-10-21 PROCEDURE — 99215 OFFICE O/P EST HI 40 MIN: CPT | Performed by: FAMILY MEDICINE

## 2024-10-21 PROCEDURE — 1036F TOBACCO NON-USER: CPT | Performed by: FAMILY MEDICINE

## 2024-10-21 PROCEDURE — 3075F SYST BP GE 130 - 139MM HG: CPT | Performed by: FAMILY MEDICINE

## 2024-10-21 PROCEDURE — 1126F AMNT PAIN NOTED NONE PRSNT: CPT | Performed by: FAMILY MEDICINE

## 2024-10-21 PROCEDURE — 83036 HEMOGLOBIN GLYCOSYLATED A1C: CPT | Mod: MUE | Performed by: FAMILY MEDICINE

## 2024-10-21 PROCEDURE — 1170F FXNL STATUS ASSESSED: CPT | Performed by: FAMILY MEDICINE

## 2024-10-21 PROCEDURE — 3078F DIAST BP <80 MM HG: CPT | Performed by: FAMILY MEDICINE

## 2024-10-21 RX ORDER — LIRAGLUTIDE 6 MG/ML
1.8 INJECTION SUBCUTANEOUS
COMMUNITY

## 2024-10-21 RX ORDER — VIT C/E/ZN/COPPR/LUTEIN/ZEAXAN 250MG-90MG
CAPSULE ORAL EVERY 24 HOURS
COMMUNITY

## 2024-10-21 ASSESSMENT — ENCOUNTER SYMPTOMS
DYSPHORIC MOOD: 0
SHORTNESS OF BREATH: 0
ACTIVITY CHANGE: 0
DIFFICULTY URINATING: 0
CONSTIPATION: 0
HEADACHES: 0
CHEST TIGHTNESS: 0
BLOOD IN STOOL: 0
DIZZINESS: 0
ABDOMINAL PAIN: 0
FREQUENCY: 0
POLYPHAGIA: 0
AGITATION: 0
PALPITATIONS: 0
POLYDIPSIA: 0
DIARRHEA: 0
COUGH: 0

## 2024-10-21 ASSESSMENT — ACTIVITIES OF DAILY LIVING (ADL)
TAKING_MEDICATION: INDEPENDENT
DOING_HOUSEWORK: INDEPENDENT
MANAGING_FINANCES: INDEPENDENT
BATHING: INDEPENDENT
GROCERY_SHOPPING: INDEPENDENT
DRESSING: INDEPENDENT

## 2024-10-21 ASSESSMENT — PATIENT HEALTH QUESTIONNAIRE - PHQ9
1. LITTLE INTEREST OR PLEASURE IN DOING THINGS: NOT AT ALL
2. FEELING DOWN, DEPRESSED OR HOPELESS: NOT AT ALL
SUM OF ALL RESPONSES TO PHQ9 QUESTIONS 1 AND 2: 0

## 2024-10-21 ASSESSMENT — PAIN SCALES - GENERAL: PAINLEVEL_OUTOF10: 0-NO PAIN

## 2024-10-21 NOTE — PROGRESS NOTES
Subjective   Reason for Visit: Landry Hernández is an 79 y.o. male here for a Medicare Wellness visit.     Past Medical, Surgical, and Family History reviewed and updated in chart.    Reviewed all medications by prescribing practitioner or clinical pharmacist (such as prescriptions, OTCs, herbal therapies and supplements) and documented in the medical record.    HPI  He presents today for a Medicare wellness exam.  He feels that overall he is doing well.  Does state that he tries to walk.  He feels he eats pretty good diet which his wife makes for him.  1 over the med list that he has on the card that he carries with him and it matches what we have here except he has 1 EXTR 1 and there it looks like hydrochlorothiazide was written twice.  He does state that he takes 1 injection daily and 1 injection once a week.    Patient Care Team:  Fawad Wiggins MD as PCP - General (Family Medicine)  Fawad Wigigns MD as PCP - [employer] PCP     Review of Systems   Constitutional:  Negative for activity change.   HENT:  Negative for congestion.    Respiratory:  Negative for cough, chest tightness and shortness of breath.    Cardiovascular:  Negative for chest pain, palpitations and leg swelling.   Gastrointestinal:  Negative for abdominal pain, blood in stool, constipation and diarrhea.   Endocrine: Negative for cold intolerance, heat intolerance, polydipsia, polyphagia and polyuria.   Genitourinary:  Negative for difficulty urinating and frequency.   Neurological:  Negative for dizziness and headaches.   Psychiatric/Behavioral:  Negative for agitation and dysphoric mood.        Objective   Vitals:  /60   Pulse 76       Physical Exam  Vitals reviewed.   Constitutional:       Appearance: Normal appearance.   HENT:      Right Ear: Tympanic membrane, ear canal and external ear normal.      Left Ear: Tympanic membrane, ear canal and external ear normal.      Nose: Nose normal.      Mouth/Throat:      Mouth:  Mucous membranes are moist.      Pharynx: Oropharynx is clear.   Eyes:      Conjunctiva/sclera: Conjunctivae normal.   Neck:      Thyroid: No thyromegaly or thyroid tenderness.      Vascular: No carotid bruit.   Cardiovascular:      Rate and Rhythm: Normal rate and regular rhythm.      Heart sounds: No murmur heard.  Pulmonary:      Effort: Pulmonary effort is normal.      Breath sounds: Normal breath sounds.   Abdominal:      General: Abdomen is flat.      Palpations: Abdomen is soft.   Musculoskeletal:      Cervical back: Neck supple.      Right lower leg: No edema.      Left lower leg: No edema.   Lymphadenopathy:      Cervical: No cervical adenopathy.   Skin:     General: Skin is warm and dry.   Neurological:      Mental Status: He is alert.   Psychiatric:         Mood and Affect: Mood normal.         Assessment & Plan  Type 2 diabetes mellitus without complication, with long-term current use of insulin (Multi)    Orders:    POCT glycosylated hemoglobin (Hb A1C) manually resulted    Comprehensive Metabolic Panel; Future    Lipid Panel; Future    Routine general medical examination at health care facility  Doing well.  Blood sugars pretty well-controlled for him at 7.2 hemoglobin A1c.  I want him to check his med list at home against the med list printed out from here.  We appear to be the same.  However, the Victoza should be taken daily along with the insulin daily.  I printed this out for him.  He will share that with his wife who apparently administers his medications.  I stressed the need for a power of  and wrote that down so he can look into that.  Will do the labs today to make sure that they get done, taking into account that they are not necessarily fasting.      Orders:    1 Year Follow Up In Primary Care - Wellness Exam; Future    Iron deficiency anemia, unspecified iron deficiency anemia type    Orders:    CBC; Future    Iron and TIBC; Future    Ferritin; Future           Advance Directives  Discussion  16 - 20 minutes were spent discussing Advanced Care Planning (including a Living Will, Medical Power Of , as well as specific end of life choices and/or directives). The details of that discussion were documented in Advanced Directives Discussion section of the medical record.

## 2024-10-21 NOTE — PATIENT INSTRUCTIONS
Your sugar is controlled. Continue your current medications.   The Victoza should be taken every day along with the Triseba insulin.     Check the blood tests today at the lab.    Be sure to set up a Power of  to make decisions should you not be able to make them.     Follow up with me in six months.

## 2024-10-25 DIAGNOSIS — R94.4 DECREASED GFR: ICD-10-CM

## 2024-10-25 DIAGNOSIS — E11.9 TYPE 2 DIABETES MELLITUS WITHOUT COMPLICATION, WITH LONG-TERM CURRENT USE OF INSULIN (MULTI): Primary | ICD-10-CM

## 2024-10-25 DIAGNOSIS — Z79.4 TYPE 2 DIABETES MELLITUS WITHOUT COMPLICATION, WITH LONG-TERM CURRENT USE OF INSULIN (MULTI): Primary | ICD-10-CM

## 2024-12-03 DIAGNOSIS — I63.9 CEREBROVASCULAR ACCIDENT (CVA), UNSPECIFIED MECHANISM (MULTI): ICD-10-CM

## 2024-12-04 RX ORDER — CLOPIDOGREL BISULFATE 75 MG/1
75 TABLET ORAL DAILY
Qty: 100 TABLET | Refills: 1 | Status: SHIPPED | OUTPATIENT
Start: 2024-12-04

## 2024-12-04 NOTE — TELEPHONE ENCOUNTER
LOV:      10/21/24     NEXT OV:   4/21/25                         LAST FILL:   3/19/24 90 days with 3 refills.  Pharmacy now requesting 100 day supply for pt.                         LABS:

## 2025-01-13 DIAGNOSIS — I10 PRIMARY HYPERTENSION: ICD-10-CM

## 2025-01-13 DIAGNOSIS — E11.51: ICD-10-CM

## 2025-01-15 RX ORDER — PIOGLITAZONEHYDROCHLORIDE 15 MG/1
15 TABLET ORAL DAILY
Qty: 90 TABLET | Refills: 0 | Status: SHIPPED | OUTPATIENT
Start: 2025-01-15

## 2025-01-15 RX ORDER — HYDROCHLOROTHIAZIDE 25 MG/1
25 TABLET ORAL
Qty: 90 TABLET | Refills: 0 | Status: SHIPPED | OUTPATIENT
Start: 2025-01-15

## 2025-01-15 NOTE — TELEPHONE ENCOUNTER
LOV:  10/21/24         NEXT OV:   4/21/25                         LAST FILL:  10/9/24 for 90 days no refills                         LABS:

## 2025-01-20 DIAGNOSIS — E78.00 HIGH CHOLESTEROL: ICD-10-CM

## 2025-01-21 RX ORDER — ATORVASTATIN CALCIUM 40 MG/1
40 TABLET, FILM COATED ORAL DAILY
Qty: 100 TABLET | Refills: 2 | Status: SHIPPED | OUTPATIENT
Start: 2025-01-21

## 2025-03-04 ENCOUNTER — TELEPHONE (OUTPATIENT)
Dept: PRIMARY CARE | Facility: CLINIC | Age: 80
End: 2025-03-04
Payer: COMMERCIAL

## 2025-03-04 NOTE — TELEPHONE ENCOUNTER
Received Novafine 32g tips, Tresiba and Ozempic from [facility].   Medications are in fridge.   Left message for patient to call.

## 2025-04-21 ENCOUNTER — APPOINTMENT (OUTPATIENT)
Dept: PRIMARY CARE | Facility: CLINIC | Age: 80
End: 2025-04-21
Payer: COMMERCIAL

## 2025-04-21 VITALS
BODY MASS INDEX: 27.04 KG/M2 | HEART RATE: 60 BPM | WEIGHT: 185.8 LBS | SYSTOLIC BLOOD PRESSURE: 124 MMHG | DIASTOLIC BLOOD PRESSURE: 60 MMHG

## 2025-04-21 DIAGNOSIS — I10 PRIMARY HYPERTENSION: ICD-10-CM

## 2025-04-21 DIAGNOSIS — Z79.4 TYPE 2 DIABETES MELLITUS WITHOUT COMPLICATION, WITH LONG-TERM CURRENT USE OF INSULIN: ICD-10-CM

## 2025-04-21 DIAGNOSIS — E11.51: ICD-10-CM

## 2025-04-21 DIAGNOSIS — I10 BENIGN ESSENTIAL HYPERTENSION: Primary | ICD-10-CM

## 2025-04-21 DIAGNOSIS — E11.9 TYPE 2 DIABETES MELLITUS WITHOUT COMPLICATION, WITH LONG-TERM CURRENT USE OF INSULIN: ICD-10-CM

## 2025-04-21 DIAGNOSIS — E78.00 HIGH CHOLESTEROL: ICD-10-CM

## 2025-04-21 LAB — POC HEMOGLOBIN A1C: 7 % (ref 4.2–6.5)

## 2025-04-21 PROCEDURE — 1126F AMNT PAIN NOTED NONE PRSNT: CPT | Performed by: FAMILY MEDICINE

## 2025-04-21 PROCEDURE — 99214 OFFICE O/P EST MOD 30 MIN: CPT | Performed by: FAMILY MEDICINE

## 2025-04-21 PROCEDURE — 1123F ACP DISCUSS/DSCN MKR DOCD: CPT | Performed by: FAMILY MEDICINE

## 2025-04-21 PROCEDURE — 1159F MED LIST DOCD IN RCRD: CPT | Performed by: FAMILY MEDICINE

## 2025-04-21 PROCEDURE — 1158F ADVNC CARE PLAN TLK DOCD: CPT | Performed by: FAMILY MEDICINE

## 2025-04-21 PROCEDURE — 1036F TOBACCO NON-USER: CPT | Performed by: FAMILY MEDICINE

## 2025-04-21 PROCEDURE — 3078F DIAST BP <80 MM HG: CPT | Performed by: FAMILY MEDICINE

## 2025-04-21 PROCEDURE — 83036 HEMOGLOBIN GLYCOSYLATED A1C: CPT | Mod: QW | Performed by: FAMILY MEDICINE

## 2025-04-21 PROCEDURE — 1157F ADVNC CARE PLAN IN RCRD: CPT | Performed by: FAMILY MEDICINE

## 2025-04-21 PROCEDURE — 3074F SYST BP LT 130 MM HG: CPT | Performed by: FAMILY MEDICINE

## 2025-04-21 PROCEDURE — 1160F RVW MEDS BY RX/DR IN RCRD: CPT | Performed by: FAMILY MEDICINE

## 2025-04-21 RX ORDER — SEMAGLUTIDE 1.34 MG/ML
0.25 INJECTION, SOLUTION SUBCUTANEOUS WEEKLY
COMMUNITY

## 2025-04-21 RX ORDER — PNV NO.95/FERROUS FUM/FOLIC AC 28MG-0.8MG
100 TABLET ORAL DAILY
COMMUNITY

## 2025-04-21 ASSESSMENT — ENCOUNTER SYMPTOMS
HEADACHES: 0
CHEST TIGHTNESS: 0
PALPITATIONS: 0
SHORTNESS OF BREATH: 0
DIZZINESS: 0
ACTIVITY CHANGE: 0
COUGH: 0

## 2025-04-21 ASSESSMENT — PAIN SCALES - GENERAL: PAINLEVEL_OUTOF10: 0-NO PAIN

## 2025-04-21 NOTE — PATIENT INSTRUCTIONS
Increase to 0.5 mg of the ozempic and let me  know how that does with appetite over the next 3-4 weeks. If good, will continue. Otherwise, will increase to 1 mg.     Check your blood test in 6 months and follow up right after that.

## 2025-04-21 NOTE — PROGRESS NOTES
Subjective   Patient ID: Landry Hernández is a 80 y.o. male who presents for Diabetes and Hypertension.    HPI   He presents today for regular follow-up.  He is here with his wife who went over his medications with Sherry.  He is on Ozempic 0.25 mg and his appetite is actually, according to his wife.  She herself is on Ozempic, she states that decreases her appetite and she is wondering which should be doing it for him.  No trouble tolerating his medications.  Hemoglobin A1c is 7 today.    Review of Systems   Constitutional:  Negative for activity change.   Respiratory:  Negative for cough, chest tightness and shortness of breath.    Cardiovascular:  Negative for chest pain, palpitations and leg swelling.   Neurological:  Negative for dizziness and headaches.       Objective   /60   Pulse 60   Wt 84.3 kg (185 lb 12.8 oz)   BMI 27.04 kg/m²     Physical Exam  Constitutional:       Appearance: Normal appearance.   Neck:      Thyroid: No thyromegaly or thyroid tenderness.      Vascular: No carotid bruit.   Cardiovascular:      Rate and Rhythm: Normal rate and regular rhythm.      Heart sounds: No murmur heard.  Pulmonary:      Effort: Pulmonary effort is normal.      Breath sounds: Normal breath sounds.   Musculoskeletal:      Cervical back: Neck supple.   Neurological:      Mental Status: He is alert.   Psychiatric:         Mood and Affect: Mood normal.         Assessment/Plan   Diagnoses and all orders for this visit:  Benign essential hypertension  -     Comprehensive Metabolic Panel; Future  -     CBC; Future  Type 2 diabetes mellitus without complication, with long-term current use of insulin  -     POCT glycosylated hemoglobin (Hb A1C) manually resulted  -     Hemoglobin A1C; Future  Primary hypertension  High cholesterol  -     Lipid Panel; Future  Diabetes mellitus with peripheral angiopathy (Multi)  Recommend regular physical activity, walking for 30 minutes each day.  Will increase the Ozempic up to 0.5 mg  to see if we get the appetite down and then some weight coming off.  His wife states she has enough of the pens to get him to 3 or 4 weeks and then she let me know at that time how he is doing.  Appetite is good we will continue with the dose.  However, if he continues have a large appetite may consider going up to 1 mg.  He can follow-up with me in 6 months.

## 2025-04-22 RX ORDER — PIOGLITAZONE 15 MG/1
15 TABLET ORAL DAILY
Qty: 90 TABLET | Refills: 1 | Status: SHIPPED | OUTPATIENT
Start: 2025-04-22

## 2025-04-22 RX ORDER — HYDROCHLOROTHIAZIDE 25 MG/1
25 TABLET ORAL
Qty: 90 TABLET | Refills: 1 | Status: SHIPPED | OUTPATIENT
Start: 2025-04-22

## 2025-05-17 DIAGNOSIS — I63.9 CEREBROVASCULAR ACCIDENT (CVA), UNSPECIFIED MECHANISM (MULTI): ICD-10-CM

## 2025-05-19 RX ORDER — CLOPIDOGREL BISULFATE 75 MG/1
75 TABLET ORAL DAILY
Qty: 90 TABLET | Refills: 1 | Status: SHIPPED | OUTPATIENT
Start: 2025-05-19

## 2025-08-05 ENCOUNTER — TELEPHONE (OUTPATIENT)
Dept: PRIMARY CARE | Facility: CLINIC | Age: 80
End: 2025-08-05
Payer: COMMERCIAL

## 2025-08-05 DIAGNOSIS — Z79.4 TYPE 2 DIABETES MELLITUS WITHOUT COMPLICATION, WITH LONG-TERM CURRENT USE OF INSULIN: ICD-10-CM

## 2025-08-05 DIAGNOSIS — E11.9 TYPE 2 DIABETES MELLITUS WITHOUT COMPLICATION, WITH LONG-TERM CURRENT USE OF INSULIN: ICD-10-CM

## 2025-08-05 NOTE — TELEPHONE ENCOUNTER
Patient  sent [employer] message and informed that urine albumin-creatine ratio has been order as a part of patients routine diabetic screening.     Labwork ordered per office protocol.

## 2025-08-16 DIAGNOSIS — I63.9 CEREBROVASCULAR ACCIDENT (CVA), UNSPECIFIED MECHANISM (MULTI): ICD-10-CM

## 2025-08-19 RX ORDER — CLOPIDOGREL BISULFATE 75 MG/1
75 TABLET ORAL DAILY
Qty: 90 TABLET | Refills: 1 | Status: SHIPPED | OUTPATIENT
Start: 2025-08-19

## (undated) DEVICE — STAPLER SKIN DISP - (6/BX 10BX/CA) VISISTAT

## (undated) DEVICE — LACTATED RINGERS INJ 1000 ML - (14EA/CA 60CA/PF)

## (undated) DEVICE — TUBING CLEARLINK DUO-VENT - C-FLO (48EA/CA)

## (undated) DEVICE — GOWN WARMING STANDARD FLEX - (30/CA)

## (undated) DEVICE — SUCTION INSTRUMENT YANKAUER BULBOUS TIP W/O VENT (50EA/CA)

## (undated) DEVICE — CANISTER SUCTION 3000ML MECHANICAL FILTER AUTO SHUTOFF MEDI-VAC NONSTERILE LF DISP (40EA/CA)

## (undated) DEVICE — SUTURE 0 SILK TIES (36PK/BX)

## (undated) DEVICE — SPONGE GAUZESTER 4 X 4 4PLY - (128PK/CA)

## (undated) DEVICE — SET LEADWIRE 5 LEAD BEDSIDE DISPOSABLE ECG (1SET OF 5/EA)

## (undated) DEVICE — SET EXTENSION WITH 2 PORTS (48EA/CA) ***PART #2C8610 IS A SUBSTITUTE*****

## (undated) DEVICE — NEEDLE NON SAFETY 25 GA X 1 1/2 IN HYPO (100EA/BX)

## (undated) DEVICE — JAW BRA #93

## (undated) DEVICE — SUTURE 3-0 SILK SH C/R 18 IN - (12/BX)

## (undated) DEVICE — SPONGE GAUZE STER 4X4 8-PL - (2/PK 50PK/BX 12BX/CS)

## (undated) DEVICE — SUTURE 3-0 CHROMIC GUT SH 27 (36PK/BX)"

## (undated) DEVICE — SPANDAGE CHEST SZ10 25YDS - STRETCH (21 EA/CA 1RL/CA)

## (undated) DEVICE — DRAPE SURGICAL U 77X120 - (10/CA)

## (undated) DEVICE — CATHETER IV 14 GA X 2 ---SURG.& SDS ONLY---(200EA/CA)

## (undated) DEVICE — SODIUM CHL IRRIGATION 0.9% 1000ML (12EA/CA)

## (undated) DEVICE — PACK MINOR BASIN - (2EA/CA)

## (undated) DEVICE — GLOVE BIOGEL SZ 8 SURGICAL PF LTX - (50PR/BX 4BX/CA)

## (undated) DEVICE — SENSOR OXIMETER ADULT SPO2 RD SET (20EA/BX)